# Patient Record
Sex: FEMALE | Race: WHITE | NOT HISPANIC OR LATINO | Employment: UNEMPLOYED | URBAN - METROPOLITAN AREA
[De-identification: names, ages, dates, MRNs, and addresses within clinical notes are randomized per-mention and may not be internally consistent; named-entity substitution may affect disease eponyms.]

---

## 2023-03-14 NOTE — PROGRESS NOTES
Chief Complaint  Establish Care and Annual Exam (Yearly Check Up )    Subjective          Roya presents to [facility] for a new patient visit.  Accompanied by mother.  Her father  last year, and she and her mother moved to [place] recently.    She has a seizure disorder, cerebral palsy, intellectual disability, chronic vision loss.    She was born via emergency  status post meconium aspiration.  She apparently was nearly  at the time of birth, but was resuscitated.  She had seizures shortly after birth which were controlled until she was about 6 years old.  At that time she started seeing a neurologist, has been on stable dose of Tegretol 100 mg, 1.5 tablets in the morning, 1.5 tablets at noon, 2.5 tablets at bedtime.  Seizures previously were grand mal, has not had one of those since age 11.  However, there is concern that she is having small seizures due to staring spells.  She needs a referral to a neurologist here in [place].    At baseline, she is able to say a few words.  She does not walk, but she crawls to get around.  No major issues with spasticity. She was in a wheelchair today in the office.  She is able to feed herself, but her mother does note gurgling at the time of meals.  She has chronic vision loss and will need an ophthalmologist.    I noticed she is tachycardic, mother states that is been a chronic issue.  States that there was some sort of issue with her heart when she was younger, but has not seen a cardiologist in many years.    She has been on Depo-Provera for menstrual suppression since the age of 14 or 15.  She is due for another injection very soon.  She has not had any issues with this medication.    As far as routine maintenance issues, she has low tolerance for being touched or any sort of medical procedure.  Getting labs drawn is very difficult for her.  Her mother does not think she could tolerate a mammogram much less any  "sort of treatment for breast cancer should it be found.    No issues with wounds or decubitus ulcers, but she does occasionally get scratches on her trunk.    In the last year or so, she has had significant issues with rosacea and eczema on her face.  She uses metronidazole cream for that.  Needs a dermatologist here in [place].     Objective   Vital Signs:   Vitals:    03/21/23 1245   BP: 132/84   Pulse: 114   Temp: 97.8 °F (36.6 °C)   SpO2: 98%   Height: 158.8 cm (62.5\")                Physical Exam  Constitutional:       General: She is not in acute distress.     Appearance: Normal appearance. She is obese. She is not toxic-appearing.   HENT:      Head: Normocephalic and atraumatic.      Mouth/Throat:      Mouth: Mucous membranes are moist.   Eyes:      General: No scleral icterus.     Conjunctiva/sclera: Conjunctivae normal.   Cardiovascular:      Rate and Rhythm: Regular rhythm. Tachycardia present.      Heart sounds: Normal heart sounds. No murmur heard.    No friction rub. No gallop.   Pulmonary:      Effort: Pulmonary effort is normal. No respiratory distress.      Comments: Occasional coarse breath sounds bilateral lungs  Musculoskeletal:         General: No swelling, tenderness or deformity. Normal range of motion.      Cervical back: Normal range of motion and neck supple.   Skin:     General: Skin is warm and dry.      Findings: No lesion or rash.   Neurological:      General: No focal deficit present.      Mental Status: She is alert. Mental status is at baseline.      Motor: Weakness present.      Comments: In wheelchair, muscle atrophy in lower extremities   Psychiatric:      Comments: Calm and cooperative          Result Review :                Assessment and Plan    Diagnoses and all orders for this visit:    1. Nonintractable epilepsy without status epilepticus, unspecified epilepsy type (HCC) (Primary)  Assessment & Plan:  Previously grand mal seizures, has not had one of those since the age of " 11.  She has been on a stable dose of Tegretol for many years.  Her mother is requesting refills which I told I could provide in the short-term but that I was not comfortable prescribing in the long-term. Refer to neurology.  There is concern that she is having partial seizures, even more reason to get her into neurology.    Orders:  -     Ambulatory Referral to Neurology    2. Routine health maintenance  Assessment & Plan:  We will hold off on checking labs as that is very difficult for the patient.  Will obtain medical records from prior medical providers, as she apparently recently had labs in January 2023.  If we need labs, will reach out to patient's mother.    As far as procedures like mammograms, Pap smears, colon cancer screening, her mother does not believe she would be able to tolerate these tests much less interventions for these problems.  Thus, we will hold off on ordering.      3. Rosacea  Assessment & Plan:  Continue metronidazole cream, refilled, and refer to dermatology.    Orders:  -     Ambulatory Referral to Dermatology    4. Eczema, unspecified type  -     Ambulatory Referral to Dermatology    5. Other cerebral palsy (HCC)  Assessment & Plan:  I signed a form which is to be scanned into the chart indicating that the patient has permanent disability from her chronic medical conditions.    Orders:  -     Ambulatory Referral to Neurology  -     Ambulatory Referral to Speech Therapy    6. Gurgling breath sounds  Assessment & Plan:  I noticed some intermittent rhonchi on exam.  Patient's mother states that she notices gurgling when patient eats.  I believe a speech therapy evaluation is in order as I do worry about the possibility of aspiration.  Speech therapy evaluation ordered.    Orders:  -     Ambulatory Referral to Speech Therapy    7. Need for vaccination    8. Visual impairment  Assessment & Plan:  Refer to ophthalmology    Orders:  -     Ambulatory Referral to Ophthalmology    9. Menstrual  suppression  Assessment & Plan:  Continue Depo-Provera, has not had any issues.  I sent the prescription to the pharmacy, will need a nurse visit every 13 weeks for injection.        10. Intellectual disability    Other orders  -     Tdap Vaccine Greater Than or Equal To 6yo IM  -     metroNIDAZOLE (METROCREAM) 0.75 % cream; Apply 1 application topically to the appropriate area as directed 2 (Two) Times a Day.  Dispense: 45 g; Refill: 6  -     medroxyPROGESTERone Acetate (Depo-Provera) 150 MG/ML suspension prefilled syringe; Inject 1 mL into the appropriate muscle as directed by prescriber Every 3 (Three) Months. Specifically, give every 13 weeks  Dispense: 1 mL; Refill: 3  -     carBAMazepine (TEGretol) 100 MG chewable tablet; Chew 1 and 1/2 tabs in the morning , 1 and 1/2 tabs in the afternoon , then 2 and 1/2 tabs at bed time  Dispense: 165 tablet; Refill: 2      Follow Up   Return in about 6 months (around 9/21/2023), or if symptoms worsen or fail to improve, for Adult Wellness Visit-30 Minutes.  Patient was given instructions and counseling regarding her condition or for health maintenance advice. Please see specific information pulled into the AVS if appropriate.

## 2023-03-21 ENCOUNTER — OFFICE VISIT (OUTPATIENT)
Dept: FAMILY MEDICINE CLINIC | Facility: CLINIC | Age: 43
End: 2023-03-21
Payer: COMMERCIAL

## 2023-03-21 VITALS
HEART RATE: 114 BPM | HEIGHT: 63 IN | TEMPERATURE: 97.8 F | OXYGEN SATURATION: 98 % | DIASTOLIC BLOOD PRESSURE: 84 MMHG | SYSTOLIC BLOOD PRESSURE: 132 MMHG

## 2023-03-21 DIAGNOSIS — H54.7 VISUAL IMPAIRMENT: ICD-10-CM

## 2023-03-21 DIAGNOSIS — R09.89 GURGLING BREATH SOUNDS: ICD-10-CM

## 2023-03-21 DIAGNOSIS — Z23 NEED FOR VACCINATION: ICD-10-CM

## 2023-03-21 DIAGNOSIS — Z00.00 ROUTINE HEALTH MAINTENANCE: ICD-10-CM

## 2023-03-21 DIAGNOSIS — L71.9 ROSACEA: ICD-10-CM

## 2023-03-21 DIAGNOSIS — L30.9 ECZEMA, UNSPECIFIED TYPE: ICD-10-CM

## 2023-03-21 DIAGNOSIS — N94.89 MENSTRUAL SUPPRESSION: ICD-10-CM

## 2023-03-21 DIAGNOSIS — F79 INTELLECTUAL DISABILITY: ICD-10-CM

## 2023-03-21 DIAGNOSIS — G80.8 OTHER CEREBRAL PALSY: ICD-10-CM

## 2023-03-21 DIAGNOSIS — G40.909 NONINTRACTABLE EPILEPSY WITHOUT STATUS EPILEPTICUS, UNSPECIFIED EPILEPSY TYPE: Primary | ICD-10-CM

## 2023-03-21 RX ORDER — MEDROXYPROGESTERONE ACETATE 150 MG/ML
INJECTION, SUSPENSION INTRAMUSCULAR
COMMUNITY
Start: 2023-03-14 | End: 2023-03-21 | Stop reason: SDUPTHER

## 2023-03-21 RX ORDER — CARBAMAZEPINE 100 MG/1
100 TABLET, CHEWABLE ORAL
COMMUNITY
Start: 2023-03-14 | End: 2023-03-21 | Stop reason: SDUPTHER

## 2023-03-21 RX ORDER — MEDROXYPROGESTERONE ACETATE 150 MG/ML
150 INJECTION, SUSPENSION INTRAMUSCULAR
Qty: 1 ML | Refills: 3 | Status: SHIPPED | OUTPATIENT
Start: 2023-03-21

## 2023-03-21 RX ORDER — CARBAMAZEPINE 100 MG/1
TABLET, CHEWABLE ORAL
Qty: 165 TABLET | Refills: 2 | Status: SHIPPED | OUTPATIENT
Start: 2023-03-21

## 2023-03-21 NOTE — ASSESSMENT & PLAN NOTE
I signed a form which is to be scanned into the chart indicating that the patient has permanent disability from her chronic medical conditions.

## 2023-03-21 NOTE — ASSESSMENT & PLAN NOTE
Continue Depo-Provera, has not had any issues.  I sent the prescription to the pharmacy, will need a nurse visit every 13 weeks for injection.

## 2023-03-21 NOTE — ASSESSMENT & PLAN NOTE
We will hold off on checking labs as that is very difficult for the patient.  Will obtain medical records from prior medical providers, as she apparently recently had labs in January 2023.  If we need labs, will reach out to patient's mother.    As far as procedures like mammograms, Pap smears, colon cancer screening, her mother does not believe she would be able to tolerate these tests much less interventions for these problems.  Thus, we will hold off on ordering.

## 2023-03-21 NOTE — ASSESSMENT & PLAN NOTE
Previously grand mal seizures, has not had one of those since the age of 11.  She has been on a stable dose of Tegretol for many years.  Her mother is requesting refills which I told I could provide in the short-term but that I was not comfortable prescribing in the long-term. Refer to neurology.  There is concern that she is having partial seizures, even more reason to get her into neurology.

## 2023-03-31 ENCOUNTER — CLINICAL SUPPORT (OUTPATIENT)
Dept: FAMILY MEDICINE CLINIC | Facility: CLINIC | Age: 43
End: 2023-03-31
Payer: COMMERCIAL

## 2023-03-31 DIAGNOSIS — N94.89 MENSTRUAL SUPPRESSION: Primary | ICD-10-CM

## 2023-03-31 RX ORDER — MEDROXYPROGESTERONE ACETATE 150 MG/ML
150 INJECTION, SUSPENSION INTRAMUSCULAR ONCE
Status: COMPLETED | OUTPATIENT
Start: 2023-03-31 | End: 2023-03-31

## 2023-03-31 RX ADMIN — MEDROXYPROGESTERONE ACETATE 150 MG: 150 INJECTION, SUSPENSION INTRAMUSCULAR at 16:02

## 2023-03-31 NOTE — PROGRESS NOTES
Injection  Injection performed in RA by Patrizia Portillo LPN.   03/31/23   Patrizia Portillo LPN

## 2023-06-05 ENCOUNTER — CLINICAL SUPPORT (OUTPATIENT)
Dept: FAMILY MEDICINE CLINIC | Facility: CLINIC | Age: 43
End: 2023-06-05
Payer: MEDICARE

## 2023-06-05 DIAGNOSIS — N94.89 MENSTRUAL SUPPRESSION: Primary | ICD-10-CM

## 2023-06-05 PROCEDURE — 96372 THER/PROPH/DIAG INJ SC/IM: CPT | Performed by: FAMILY MEDICINE

## 2023-06-05 RX ORDER — MEDROXYPROGESTERONE ACETATE 150 MG/ML
150 INJECTION, SUSPENSION INTRAMUSCULAR ONCE
Status: COMPLETED | OUTPATIENT
Start: 2023-06-05 | End: 2023-06-05

## 2023-06-05 RX ADMIN — MEDROXYPROGESTERONE ACETATE 150 MG: 150 INJECTION, SUSPENSION INTRAMUSCULAR at 14:39

## 2023-06-05 NOTE — PROGRESS NOTES
Injection  Injection performed in RD by Patrizia Portillo LPN. Patient tolerated the procedure well without complications.  06/05/23   Patrizia Portillo LPN

## 2023-07-21 ENCOUNTER — TELEPHONE (OUTPATIENT)
Dept: FAMILY MEDICINE CLINIC | Facility: CLINIC | Age: 43
End: 2023-07-21

## 2023-07-21 NOTE — TELEPHONE ENCOUNTER
Caller: FORREST GREWAL    Relationship: Home Health    Best call back number: [phone]     What was the call regarding: FORREST GREWAL, PHYSICAL THERAPIST, STATED THAT PATIENT HAS CP. FORREST STATED THAT MOM DID NOT FEEL THERE WAS A PROBLEM WITH MOBILITY OR STRENGTH. FORREST STATED IT IS MORE PROBLEM SOLVING FOR BATHROOM TRANSFERS AND GETTING CONTACTS FOR COMMUNITY RESOURCES, WHICH THEY WERE ABLE TO COMPLETE IN ONE VISIT, SO SHE WILL NOT BE PICKING HER UP FOR MORE VISITS. THEY ACCOMPLISHED ALL THAT THEY NEEDED TO TODAY. IF ANY QUESTIONS, PLEASE CALL HER.

## 2023-08-28 NOTE — PROGRESS NOTES
"Chief Complaint  Seizures (Follow up and wanting to get injection today )    Subjective        HPI   Roya presents to [facility] for a follow up visit. She is accompanied by her mother/caretaker Kenisha.    Neurology appt: Saw Dr. Toure, Tegretol changed to Keppra.     Menstrual supression: Due for DepoProvera injection. Did have some bleeding when late for injection.     Saw ophyovanyo. Kenisha states pt would need [place] for full exam. Ophtho has privileges at [place] and [place]. She also needs a dentist but pt unable to tolerate any sort of exam/cleaning without anesthesia.    Trying to get medicaid waiver, needs additional documentation from PCP. Roya can feed herself, use restroom on own (crawls), can get in and out of chair/bed, but needs help dressing/bathing/brushing teeth. She is minimally verbal, uses some sign language to communicate. Unable to tolerate most medical exams, tests. She has abrasions/thickening of the skin on her knees from crawling, unable to stand on them due to contractures etc.     Gets COVID and flu vaccines when due    Got labs at Dr. Toure's office, need records        Objective   Vital Signs:   Vitals:    09/01/23 0959   BP: 120/86   BP Location: Right arm   Patient Position: Sitting   Cuff Size: Large Adult   Pulse: 89   SpO2: 98%   Height: 158.8 cm (62.5\")                Physical Exam  Constitutional:       General: She is not in acute distress.     Appearance: Normal appearance. She is obese.   HENT:      Head: Normocephalic and atraumatic.   Eyes:      Conjunctiva/sclera: Conjunctivae normal.      Comments: Wears glasses   Cardiovascular:      Rate and Rhythm: Normal rate.      Comments: Declines exam  Pulmonary:      Effort: Pulmonary effort is normal. No respiratory distress.      Comments: Pt declines  Musculoskeletal:         General: Deformity present. No swelling.      Comments: Flexion contractures BLE   Skin:     Coloration: Skin is not " jaundiced.      Findings: No rash.      Comments: Skin thickening on knees   Neurological:      General: No focal deficit present.      Mental Status: She is alert. Mental status is at baseline.      Comments: In wheelchair, flexion contractures, minimally verbal, flexion contractures b/l knees, chronic vision impairment        Result Review :                Assessment and Plan    Diagnoses and all orders for this visit:    1. Menstrual suppression (Primary)  Assessment & Plan:  Had breakthrough bleeding when late for dose. Con't DeproProvera injections q3 mos, given today.    Orders:  -     MedroxyPROGESTERone Acetate (DEPO-PROVERA) injection 150 mg    2. Routine health maintenance  Assessment & Plan:  As far as procedures like mammograms, Pap smears, colon cancer screening, her mother does not believe she would be able to tolerate these tests much less interventions for these problems.  Thus, we will not order. However, she is overdue for dental care--likely would need anesthesia to tolerate exam/cleaning/procedures. Will investigate options for her.       3. Nonintractable epilepsy without status epilepticus, unspecified epilepsy type  Assessment & Plan:  On Keppra per Dr. Toure      4. Intellectual disability  Assessment & Plan:  Appears to be severe, only able to feed self, crawl to bathroom, otherwise requires total care from her mother      5. Other cerebral palsy    6. Visual impairment  Assessment & Plan:  Follows with ophtho, unable to get full exam without anesthesia per pt's mother      Obtain lab records from Dr. Toure's office  Can get flu vaccine at local pharmacy (do not have here in office at this time)      Follow Up   Return in about 1 month (around 10/1/2023) for Annual physical, Recheck.  Patient was given instructions and counseling regarding her condition or for health maintenance advice. Please see specific information pulled into the AVS if appropriate. Answers submitted by the patient for  this visit:  Primary Reason for Visit (Submitted on 8/30/2023)  What is the primary reason for your visit?: Physical

## 2023-09-01 ENCOUNTER — OFFICE VISIT (OUTPATIENT)
Dept: FAMILY MEDICINE CLINIC | Facility: CLINIC | Age: 43
End: 2023-09-01
Payer: COMMERCIAL

## 2023-09-01 ENCOUNTER — TELEPHONE (OUTPATIENT)
Dept: FAMILY MEDICINE CLINIC | Facility: CLINIC | Age: 43
End: 2023-09-01

## 2023-09-01 VITALS
DIASTOLIC BLOOD PRESSURE: 86 MMHG | HEIGHT: 63 IN | HEART RATE: 89 BPM | SYSTOLIC BLOOD PRESSURE: 120 MMHG | OXYGEN SATURATION: 98 %

## 2023-09-01 DIAGNOSIS — F79 INTELLECTUAL DISABILITY: ICD-10-CM

## 2023-09-01 DIAGNOSIS — N94.89 MENSTRUAL SUPPRESSION: Primary | ICD-10-CM

## 2023-09-01 DIAGNOSIS — G40.909 NONINTRACTABLE EPILEPSY WITHOUT STATUS EPILEPTICUS, UNSPECIFIED EPILEPSY TYPE: ICD-10-CM

## 2023-09-01 DIAGNOSIS — Z00.00 ROUTINE HEALTH MAINTENANCE: ICD-10-CM

## 2023-09-01 DIAGNOSIS — H54.7 VISUAL IMPAIRMENT: ICD-10-CM

## 2023-09-01 DIAGNOSIS — G80.8 OTHER CEREBRAL PALSY: ICD-10-CM

## 2023-09-01 RX ORDER — MEDROXYPROGESTERONE ACETATE 150 MG/ML
150 INJECTION, SUSPENSION INTRAMUSCULAR ONCE
Status: COMPLETED | OUTPATIENT
Start: 2023-09-01 | End: 2023-09-01

## 2023-09-01 RX ORDER — LEVETIRACETAM 100 MG/ML
500 SOLUTION ORAL
COMMUNITY
Start: 2023-07-12 | End: 2024-07-06

## 2023-09-01 RX ADMIN — MEDROXYPROGESTERONE ACETATE 150 MG: 150 INJECTION, SUSPENSION INTRAMUSCULAR at 10:47

## 2023-09-01 NOTE — ASSESSMENT & PLAN NOTE
As far as procedures like mammograms, Pap smears, colon cancer screening, her mother does not believe she would be able to tolerate these tests much less interventions for these problems.  Thus, we will not order. However, she is overdue for dental care--likely would need anesthesia to tolerate exam/cleaning/procedures. Will investigate options for her.

## 2023-09-01 NOTE — TELEPHONE ENCOUNTER
See staff note. Need to send most recent OV note to [facility], [phone].

## 2023-09-01 NOTE — ASSESSMENT & PLAN NOTE
Appears to be severe, only able to feed self, crawl to bathroom, otherwise requires total care from her mother

## 2023-09-26 ENCOUNTER — TELEPHONE (OUTPATIENT)
Dept: FAMILY MEDICINE CLINIC | Facility: CLINIC | Age: 43
End: 2023-09-26

## 2023-09-26 NOTE — TELEPHONE ENCOUNTER
Caller: [facility] [employer]    Relationship: Other    Best call back number: [phone]     What was the call regarding: PATIENT'S POA STATES THAT THE PATIENT HAS BEEN COUGHING. THE NURSE THAT VISITED TODAY FOR HER ANNUAL PHYSICAL LISTENED TO HER LUNGS AND SHE DOES HAVE WHEEZING. PLEASE ADVISE ON NEXT STEPS.

## 2023-09-27 ENCOUNTER — HOSPITAL ENCOUNTER (OUTPATIENT)
Dept: GENERAL RADIOLOGY | Facility: HOSPITAL | Age: 43
Discharge: HOME | End: 2023-09-27
Admitting: INTERNAL MEDICINE
Payer: MEDICARE

## 2023-09-27 ENCOUNTER — OFFICE VISIT (OUTPATIENT)
Dept: FAMILY MEDICINE CLINIC | Facility: CLINIC | Age: 43
End: 2023-09-27
Payer: MEDICARE

## 2023-09-27 VITALS
HEIGHT: 63 IN | DIASTOLIC BLOOD PRESSURE: 90 MMHG | OXYGEN SATURATION: 99 % | HEART RATE: 105 BPM | SYSTOLIC BLOOD PRESSURE: 128 MMHG

## 2023-09-27 DIAGNOSIS — R05.2 SUBACUTE COUGH: ICD-10-CM

## 2023-09-27 DIAGNOSIS — R06.2 WHEEZING: Primary | ICD-10-CM

## 2023-09-27 PROCEDURE — 99214 OFFICE O/P EST MOD 30 MIN: CPT | Performed by: INTERNAL MEDICINE

## 2023-09-27 PROCEDURE — 71046 X-RAY EXAM CHEST 2 VIEWS: CPT

## 2023-09-27 PROCEDURE — 1160F RVW MEDS BY RX/DR IN RCRD: CPT | Performed by: INTERNAL MEDICINE

## 2023-09-27 PROCEDURE — 1159F MED LIST DOCD IN RCRD: CPT | Performed by: INTERNAL MEDICINE

## 2023-09-27 RX ORDER — MONTELUKAST SODIUM 5 MG/1
10 TABLET, CHEWABLE ORAL NIGHTLY
Qty: 60 TABLET | Refills: 6 | Status: SHIPPED | OUTPATIENT
Start: 2023-09-27

## 2023-09-27 RX ORDER — CETIRIZINE HYDROCHLORIDE 5 MG/1
5 TABLET ORAL DAILY
Qty: 300 ML | Refills: 6 | Status: SHIPPED | OUTPATIENT
Start: 2023-09-27

## 2023-09-27 NOTE — PROGRESS NOTES
"Chief Complaint  Wheezing    Subjective        HPI   Roya presents to [facility] for a cough. She is accompanied by her mother Kenisha.     Cough has been present x 6-8 weeks. Yesterday, she got a wellness visit from her insurance co and the person who examined her noted wheezing. No hx asthma, never has needed inhalers. No coughing/choking with eating. Cough is non-productive, no fevers, acting normal otherwise. Has taking using robitussin, dimetap. Patient moved here from [place] after being there 17 years, has had a cough off and on since moving here.      Objective   Vital Signs:   Vitals:    09/27/23 1504   BP: 128/90   BP Location: Right arm   Patient Position: Sitting   Cuff Size: Large Adult   Pulse: 105   SpO2: 99%   Height: 158.8 cm (62.5\")                Physical Exam  Constitutional:       General: She is not in acute distress.     Appearance: Normal appearance. She is obese. She is not toxic-appearing.   HENT:      Head: Normocephalic and atraumatic.      Mouth/Throat:      Mouth: Mucous membranes are moist.   Eyes:      General: No scleral icterus.     Conjunctiva/sclera: Conjunctivae normal.   Cardiovascular:      Rate and Rhythm: Normal rate and regular rhythm.      Heart sounds: Normal heart sounds. No murmur heard.    No friction rub. No gallop.   Pulmonary:      Effort: Pulmonary effort is normal. No respiratory distress.      Breath sounds: Wheezing present.      Comments: Exam limited by pt's ability to participate.  Musculoskeletal:         General: No swelling, tenderness or deformity. Normal range of motion.      Cervical back: Normal range of motion and neck supple.      Comments: In wheelchair, has flexion contractures BLE   Skin:     General: Skin is warm and dry.      Findings: No lesion or rash.   Neurological:      General: No focal deficit present.      Mental Status: She is alert.      Comments: Communicates using limited sign language   Psychiatric:      " Comments: Calm and cooperative        Result Review :                Assessment and Plan    Diagnoses and all orders for this visit:    1. Wheezing (Primary)  -     Cetirizine HCl (ZyrTE Childrens Allergy) 5 MG/5ML solution solution; Take 5 mL by mouth Daily.  Dispense: 300 mL; Refill: 6  -     montelukast (Singulair) 5 MG chewable tablet; Chew 2 tablets Every Night.  Dispense: 60 tablet; Refill: 6  -     XR Chest PA & Lateral    2. Subacute cough    Exam is limited by patient's ability to participate, but on the limited exam I could get, I did notice wheezing.  Patient has had a cough off and on since moving to [place], previously was living in [place] where allergies were not an issue.  She already has eczema which is another atopic condition so I would not be surprised if she has asthma.  She never has had a diagnosis of asthma, but I do wonder if this could be going on.  There is no way that I can formally diagnosis, as Roya would not be able to a tolerate pulmonary function test.  I would like her to try to get a chest x-ray, as I would like to know if she has infiltrates that would indicate concern for pneumonia and/or aspiration.  We are going to try to get that today.  I discussed treating with albuterol via a nebulizer, Kenisha concerned that she would not be able to tolerate it. Reassuringly, there is normal oxygen saturation, patient appears well, no fevers.  Patient is unable to swallow pills, sent in Zyrtec liquid and chewable Singulair to start.  Will follow-up chest x-ray.  The other thing we previously discussed was getting a speech therapy evaluation to see if she is aspirating.  We will keep this on the table as an option.    Lastly, Roya still needs to get a dental exam, but it will need to be under anesthesia.  I contacted some colleagues to see what options there are and it was recommended that we check with [facility] or [facility]. Kenisha will look into  this.      Follow Up   Return in about 4 weeks (around 10/25/2023) for Recheck, Next scheduled follow up.  Patient was given instructions and counseling regarding her condition or for health maintenance advice. Please see specific information pulled into the AVS if appropriate. Answers submitted by the patient for this visit:  Primary Reason for Visit (Submitted on 9/27/2023)  What is the primary reason for your visit?: Cough

## 2023-09-27 NOTE — Clinical Note
Please obtain most recent set of labs from  neurology (CMP, CBC), not visible in [facility]. Please also send my most recent progress note (the one prior to this visit) to [facility] again. There's a prior task with the contact information. Pt's mother said it looks like we sent it, but [facility] never got it. Thanks!

## 2023-09-28 DIAGNOSIS — K44.9 HIATAL HERNIA: Primary | ICD-10-CM

## 2023-09-28 RX ORDER — LANSOPRAZOLE
30 KIT EVERY MORNING
Qty: 300 ML | Refills: 5 | Status: SHIPPED | OUTPATIENT
Start: 2023-09-28

## 2023-11-06 NOTE — PROGRESS NOTES
"Chief Complaint  Follow-up    Subjective        HPI   Roya presents to [facility] for a follow up visit. After her last visit, I discovered that she has a large hiatal hernia. She was started on PPI and referred to surgery. Unable to find Lansoprazole liquid at any pharmacy, taking dissolvable tabs instead. Mom Kenisha said issues getting surg appt since guardian papers were not on file. She has them today.    Due for DepoProvera soon    Was having mood swings on Keppra, crying, laughing, angry, no wanting to eat. Now doing better on Lamictal.    Medicaid waiver process going well, qualifies for a day program two days per week        Objective   Vital Signs:   Vitals:    11/15/23 1000   BP: 134/86   BP Location: Left arm   Patient Position: Sitting   Cuff Size: Large Adult   Pulse: 118   SpO2: 98%   Height: 158.8 cm (62.5\")            3/21/2023    12:50 PM   PHQ-2/PHQ-9 Depression Screening   Little Interest or Pleasure in Doing Things 0-->not at all   Feeling Down, Depressed or Hopeless 0-->not at all   PHQ-9: Brief Depression Severity Measure Score 0               Physical Exam  Constitutional:       General: She is not in acute distress.     Appearance: Normal appearance. She is obese. She is not toxic-appearing.   HENT:      Head: Normocephalic and atraumatic.      Mouth/Throat:      Mouth: Mucous membranes are moist.   Eyes:      General: No scleral icterus.     Conjunctiva/sclera: Conjunctivae normal.   Cardiovascular:      Rate and Rhythm: Normal rate and regular rhythm.      Heart sounds: Normal heart sounds. No murmur heard.     No friction rub. No gallop.   Pulmonary:      Effort: Pulmonary effort is normal. No respiratory distress.      Comments: Exam limited by pt's ability to participate.  Occ wheeze but improved from prior    Musculoskeletal:         General: No swelling, tenderness or deformity. Normal range of motion.      Cervical back: Normal range of motion and " neck supple.      Comments: In wheelchair, has flexion contractures BLE   Skin:     General: Skin is warm and dry.      Findings: No lesion or rash.   Neurological:      General: No focal deficit present.      Mental Status: She is alert.      Comments: Communicates using limited sign language, limited verbal interactions   Psychiatric:      Comments: Calm and cooperative          Result Review :                Assessment and Plan    Diagnoses and all orders for this visit:    1. Hiatal hernia (Primary)  Assessment & Plan:  Large hiatal hernia on CXR, likely silently aspirating. May be reason for wheezing. Referred to surgery last visit but unable to schedule appt due to guardianship papers not on file. They will be scanned in today and Kenisha to call to set up appt. Work up and tx for this issue would be complex, likely would need anesthesia for even CT scan. Kenisha asked what I thought about not fixing it/not putting pt through this and letting natural hx of this take course. I think given Roya's mental/physical/intellectual functional status, this is a reasonable option but I also recommended at least getting Dr. Wren's opinion on options. Kenisha agreed this would be a good idea.       2. Menstrual suppression  Assessment & Plan:  Hoping pt could get this done today but I told Kenisha she's not due for about another 15 days. She can get these injections at [facility] only visits. Refilled rx.     Orders:  -     medroxyPROGESTERone Acetate (Depo-Provera) 150 MG/ML suspension prefilled syringe; Inject 1 mL into the appropriate muscle as directed by prescriber Every 3 (Three) Months. Specifically, give every 13 weeks  Dispense: 1 mL; Refill: 3    3. Other cerebral palsy    4. Nonintractable epilepsy without status epilepticus, unspecified epilepsy type  Assessment & Plan:  Follows with Dr. Toure, therapy recently changed due to mood swings on Keppra. Now on Lamictal and doing better.      5. Intellectual  disability    Other orders  -     Cancel: Fluzone (or Fluarix & Flulaval for VFC) >6mos    We were out of flu vaccines today    Follow Up   Return in about 6 months (around 5/15/2024) for Annual physical, Next scheduled follow up.  Patient was given instructions and counseling regarding her condition or for health maintenance advice. Please see specific information pulled into the AVS if appropriate.

## 2023-11-15 ENCOUNTER — OFFICE VISIT (OUTPATIENT)
Dept: FAMILY MEDICINE CLINIC | Facility: CLINIC | Age: 43
End: 2023-11-15
Payer: MEDICARE

## 2023-11-15 VITALS
HEART RATE: 118 BPM | SYSTOLIC BLOOD PRESSURE: 134 MMHG | HEIGHT: 63 IN | DIASTOLIC BLOOD PRESSURE: 86 MMHG | OXYGEN SATURATION: 98 %

## 2023-11-15 DIAGNOSIS — G80.8 OTHER CEREBRAL PALSY: ICD-10-CM

## 2023-11-15 DIAGNOSIS — K44.9 HIATAL HERNIA: Primary | ICD-10-CM

## 2023-11-15 DIAGNOSIS — G40.909 NONINTRACTABLE EPILEPSY WITHOUT STATUS EPILEPTICUS, UNSPECIFIED EPILEPSY TYPE: ICD-10-CM

## 2023-11-15 DIAGNOSIS — F79 INTELLECTUAL DISABILITY: ICD-10-CM

## 2023-11-15 DIAGNOSIS — N94.89 MENSTRUAL SUPPRESSION: ICD-10-CM

## 2023-11-15 PROCEDURE — 99214 OFFICE O/P EST MOD 30 MIN: CPT | Performed by: INTERNAL MEDICINE

## 2023-11-15 PROCEDURE — 1160F RVW MEDS BY RX/DR IN RCRD: CPT | Performed by: INTERNAL MEDICINE

## 2023-11-15 PROCEDURE — 1159F MED LIST DOCD IN RCRD: CPT | Performed by: INTERNAL MEDICINE

## 2023-11-15 RX ORDER — LAMOTRIGINE 25 MG/1
TABLET, CHEWABLE ORAL
COMMUNITY
Start: 2023-11-06 | End: 2024-01-03

## 2023-11-15 RX ORDER — MEDROXYPROGESTERONE ACETATE 150 MG/ML
150 INJECTION, SUSPENSION INTRAMUSCULAR
Qty: 1 ML | Refills: 3 | Status: SHIPPED | OUTPATIENT
Start: 2023-11-15

## 2023-11-15 NOTE — ASSESSMENT & PLAN NOTE
Follows with Dr. Toure, therapy recently changed due to mood swings on Keppra. Now on Lamictal and doing better.

## 2023-11-15 NOTE — ASSESSMENT & PLAN NOTE
Hoping pt could get this done today but I told Kenisha she's not due for about another 15 days. She can get these injections at [facility] only visits. Refilled rx.

## 2023-11-15 NOTE — ASSESSMENT & PLAN NOTE
Large hiatal hernia on CXR, likely silently aspirating. May be reason for wheezing. Referred to surgery last visit but unable to schedule appt due to guardianship papers not on file. They will be scanned in today and Kenisha to call to set up appt. Work up and tx for this issue would be complex, likely would need anesthesia for even CT scan. Kenisha asked what I thought about not fixing it/not putting pt through this and letting natural hx of this take course. I think given Roya's mental/physical/intellectual functional status, this is a reasonable option but I also recommended at least getting Dr. Wren's opinion on options. Kenisha agreed this would be a good idea.

## 2023-11-16 ENCOUNTER — TELEPHONE (OUTPATIENT)
Dept: FAMILY MEDICINE CLINIC | Facility: CLINIC | Age: 43
End: 2023-11-16

## 2023-11-16 NOTE — TELEPHONE ENCOUNTER
Hub staff attempted to follow warm transfer process and was unsuccessful     Caller: NICOLE ODELL    Relationship to patient: Mother    Best call back number: [phone]    Patient is needing: THE PATIENT WOULD LIKE TO SWITCH HER NURSE VISIT FOR AN INJECTION FROM 12/01/2023 TO 11/22/2023. PLEASE ADVISE.

## 2023-11-22 ENCOUNTER — CLINICAL SUPPORT (OUTPATIENT)
Dept: FAMILY MEDICINE CLINIC | Facility: CLINIC | Age: 43
End: 2023-11-22
Payer: MEDICARE

## 2023-11-22 DIAGNOSIS — N94.89 MENSTRUAL SUPPRESSION: Primary | ICD-10-CM

## 2023-11-22 RX ORDER — MEDROXYPROGESTERONE ACETATE 150 MG/ML
150 INJECTION, SUSPENSION INTRAMUSCULAR ONCE
Status: COMPLETED | OUTPATIENT
Start: 2023-11-22 | End: 2023-11-22

## 2023-11-22 RX ADMIN — MEDROXYPROGESTERONE ACETATE 150 MG: 150 INJECTION, SUSPENSION INTRAMUSCULAR at 11:08

## 2023-12-05 ENCOUNTER — OFFICE VISIT (OUTPATIENT)
Dept: SURGERY | Facility: CLINIC | Age: 43
End: 2023-12-05
Payer: MEDICARE

## 2023-12-05 VITALS — HEIGHT: 60 IN | SYSTOLIC BLOOD PRESSURE: 124 MMHG | DIASTOLIC BLOOD PRESSURE: 82 MMHG

## 2023-12-05 DIAGNOSIS — G80.8 OTHER CEREBRAL PALSY: ICD-10-CM

## 2023-12-05 DIAGNOSIS — K44.9 HIATAL HERNIA: Primary | ICD-10-CM

## 2023-12-05 PROCEDURE — 1159F MED LIST DOCD IN RCRD: CPT | Performed by: SURGERY

## 2023-12-05 PROCEDURE — 99203 OFFICE O/P NEW LOW 30 MIN: CPT | Performed by: SURGERY

## 2023-12-05 PROCEDURE — 1160F RVW MEDS BY RX/DR IN RCRD: CPT | Performed by: SURGERY

## 2023-12-05 RX ORDER — LANSOPRAZOLE
30 KIT EVERY MORNING
Qty: 300 ML | Refills: 5 | Status: SHIPPED | OUTPATIENT
Start: 2023-12-05

## 2023-12-20 NOTE — PROGRESS NOTES
General Surgery  Initial Office Visit    CC: Hiatal hernia    HPI: The patient is a pleasant 43 y.o. year-old lady who presents today with her mother for discussion of an incidentally discovered large hiatal hernia found on a chest x-ray performed in September when she was experiencing wheezing and there was concern she may have aspirated.  The patient has cerebral palsy and is completely nonverbal.  Her mother reports she is able to eat soft food and has never required a feeding tube for nutrition.  She denies any chronic nausea or vomiting and has had no regurgitation of undigested food.  All other history is unable to be obtained since the patient is nonverbal.    Past Medical History:   Cerebral palsy  Epilepsy    Past Surgical History:   Eye surgery  Foot surgery    Medications:   Cetirizine 5 mL daily  Lamictal 25 mg chewable tablet daily  Prevacid 10 mL daily  Keppra 5 mL daily  Depo-Provera intramuscular shot every 3 months  Metronidazole cream twice daily    Allergies: No known drug allergies    Family History: Mother with history of hyperlipidemia and type 2 diabetes, maternal grandfather with history of stroke    Social History: Non-smoker, no regular alcohol use, single and lives with her mother who is her primary caretaker    ROS: Unable to be obtained, patient is nonverbal    Physical Exam:  Height: 152 cm  Weight: Unable to be measured, she is in a motorized wheelchair  BMI: Unable to be calculated  General: No acute distress, well-nourished & well-developed  HEAD: normocephalic, atraumatic  EYES: normal conjunctiva, sclera anicteric  EARS: grossly normal hearing  NECK: supple, no thyromegaly  CARDIOVASCULAR: regular rate and rhythm  RESPIRATORY: clear to auscultation bilaterally  GASTROINTESTINAL: soft, nontender, non-distended  MUSCULOSKELETAL: Seated in a motorized wheelchair with chronic contractures of the bilateral wrists  PSYCHIATRIC: oriented x3, normal mood and affect    IMAGING:  CHEST X-RAY  (Sept 2023):  FINDINGS: There is air within a large hiatal hernia. Cardiomediastinal contours otherwise appear normal. Vascular volume is normal and the lungs are clear. The costophrenic sulci are dry.     IMPRESSION:  Large hiatal hernia. Otherwise negative chest x-ray.    ASSESSMENT & PLAN  Ms. Willis is a 43-year-old lady with incidentally discovered large hiatal hernia found on chest x-ray.  I reviewed her chest x-ray and discussed the results with the patient and her mother.  I would recommend scheduling her for an EGD to better assess her anatomy.  Clinically, it appears as though this hiatal hernia is asymptomatic.  I would also like her to complete an esophagram if she is able, but her mother is unsure if she would be able to complete the esophagram due to her intellectual disability and cerebral palsy.  After completion of the EGD and esophagram, we can regroup to discuss if any surgical intervention will be necessary.    Serina Wren MD  General, Robotic, and Endoscopic Surgery  [facility]    [address], [address]  [place] [address]  P: [phone]  F: [phone]

## 2024-01-11 ENCOUNTER — ANESTHESIA EVENT (OUTPATIENT)
Dept: GASTROENTEROLOGY | Facility: HOSPITAL | Age: 44
End: 2024-01-11
Payer: MEDICARE

## 2024-01-11 ENCOUNTER — HOSPITAL ENCOUNTER (OUTPATIENT)
Facility: HOSPITAL | Age: 44
Setting detail: HOSPITAL OUTPATIENT SURGERY
Discharge: HOME | End: 2024-01-11
Attending: SURGERY | Admitting: SURGERY
Payer: MEDICARE

## 2024-01-11 ENCOUNTER — ANESTHESIA (OUTPATIENT)
Dept: GASTROENTEROLOGY | Facility: HOSPITAL | Age: 44
End: 2024-01-11
Payer: MEDICARE

## 2024-01-11 VITALS
OXYGEN SATURATION: 99 % | HEIGHT: 60 IN | TEMPERATURE: 97.4 F | DIASTOLIC BLOOD PRESSURE: 104 MMHG | BODY MASS INDEX: 29.45 KG/M2 | RESPIRATION RATE: 20 BRPM | SYSTOLIC BLOOD PRESSURE: 186 MMHG | WEIGHT: 150 LBS | HEART RATE: 134 BPM

## 2024-01-11 DIAGNOSIS — K44.9 HIATAL HERNIA: ICD-10-CM

## 2024-01-11 PROBLEM — K20.90 ESOPHAGITIS: Status: ACTIVE | Noted: 2024-01-11

## 2024-01-11 PROCEDURE — 25810000003 LACTATED RINGERS PER 1000 ML: Performed by: SURGERY

## 2024-01-11 PROCEDURE — 88305 TISSUE EXAM BY PATHOLOGIST: CPT | Performed by: SURGERY

## 2024-01-11 PROCEDURE — 25010000002 ESMOLOL 100 MG/10ML SOLUTION: Performed by: NURSE ANESTHETIST, CERTIFIED REGISTERED

## 2024-01-11 PROCEDURE — 25010000002 PROPOFOL 10 MG/ML EMULSION: Performed by: NURSE ANESTHETIST, CERTIFIED REGISTERED

## 2024-01-11 PROCEDURE — S0260 H&P FOR SURGERY: HCPCS | Performed by: SURGERY

## 2024-01-11 PROCEDURE — 88312 SPECIAL STAINS GROUP 1: CPT | Performed by: SURGERY

## 2024-01-11 PROCEDURE — 43239 EGD BIOPSY SINGLE/MULTIPLE: CPT | Performed by: SURGERY

## 2024-01-11 PROCEDURE — 25010000002 GLYCOPYRROLATE 0.2 MG/ML SOLUTION: Performed by: NURSE ANESTHETIST, CERTIFIED REGISTERED

## 2024-01-11 RX ORDER — LANSOPRAZOLE 30 MG/1
30 TABLET, ORALLY DISINTEGRATING, DELAYED RELEASE ORAL
Qty: 30 TABLET | Refills: 11 | Status: SHIPPED | OUTPATIENT
Start: 2024-01-11

## 2024-01-11 RX ORDER — SODIUM CHLORIDE 0.9 % (FLUSH) 0.9 %
10 SYRINGE (ML) INJECTION AS NEEDED
Status: DISCONTINUED | OUTPATIENT
Start: 2024-01-11 | End: 2024-01-11 | Stop reason: HOSPADM

## 2024-01-11 RX ORDER — ESMOLOL HYDROCHLORIDE 10 MG/ML
INJECTION INTRAVENOUS AS NEEDED
Status: DISCONTINUED | OUTPATIENT
Start: 2024-01-11 | End: 2024-01-11 | Stop reason: SURG

## 2024-01-11 RX ORDER — SODIUM CHLORIDE 0.9 % (FLUSH) 0.9 %
10 SYRINGE (ML) INJECTION EVERY 12 HOURS SCHEDULED
Status: DISCONTINUED | OUTPATIENT
Start: 2024-01-11 | End: 2024-01-11 | Stop reason: HOSPADM

## 2024-01-11 RX ORDER — LIDOCAINE HYDROCHLORIDE 20 MG/ML
INJECTION, SOLUTION INFILTRATION; PERINEURAL AS NEEDED
Status: DISCONTINUED | OUTPATIENT
Start: 2024-01-11 | End: 2024-01-11 | Stop reason: SURG

## 2024-01-11 RX ORDER — SODIUM CHLORIDE, SODIUM LACTATE, POTASSIUM CHLORIDE, CALCIUM CHLORIDE 600; 310; 30; 20 MG/100ML; MG/100ML; MG/100ML; MG/100ML
30 INJECTION, SOLUTION INTRAVENOUS CONTINUOUS PRN
Status: DISCONTINUED | OUTPATIENT
Start: 2024-01-11 | End: 2024-01-11 | Stop reason: HOSPADM

## 2024-01-11 RX ORDER — PROPOFOL 10 MG/ML
VIAL (ML) INTRAVENOUS CONTINUOUS PRN
Status: DISCONTINUED | OUTPATIENT
Start: 2024-01-11 | End: 2024-01-11 | Stop reason: SURG

## 2024-01-11 RX ORDER — SODIUM CHLORIDE 9 MG/ML
40 INJECTION, SOLUTION INTRAVENOUS AS NEEDED
Status: DISCONTINUED | OUTPATIENT
Start: 2024-01-11 | End: 2024-01-11 | Stop reason: HOSPADM

## 2024-01-11 RX ORDER — GLYCOPYRROLATE 0.2 MG/ML
INJECTION INTRAMUSCULAR; INTRAVENOUS AS NEEDED
Status: DISCONTINUED | OUTPATIENT
Start: 2024-01-11 | End: 2024-01-11 | Stop reason: SURG

## 2024-01-11 RX ADMIN — SODIUM CHLORIDE, POTASSIUM CHLORIDE, SODIUM LACTATE AND CALCIUM CHLORIDE 30 ML/HR: 600; 310; 30; 20 INJECTION, SOLUTION INTRAVENOUS at 07:57

## 2024-01-11 RX ADMIN — PROPOFOL 200 MCG/KG/MIN: 10 INJECTION, EMULSION INTRAVENOUS at 08:53

## 2024-01-11 RX ADMIN — ESMOLOL HYDROCHLORIDE 5 MG: 100 INJECTION, SOLUTION INTRAVENOUS at 09:02

## 2024-01-11 RX ADMIN — ESMOLOL HYDROCHLORIDE 10 MG: 100 INJECTION, SOLUTION INTRAVENOUS at 09:15

## 2024-01-11 RX ADMIN — PROPOFOL 80 MG: 10 INJECTION, EMULSION INTRAVENOUS at 08:54

## 2024-01-11 RX ADMIN — LIDOCAINE HYDROCHLORIDE 100 MG: 20 INJECTION, SOLUTION INFILTRATION; PERINEURAL at 08:53

## 2024-01-11 RX ADMIN — GLYCOPYRROLATE 0.2 MG: 0.2 INJECTION INTRAMUSCULAR; INTRAVENOUS at 08:55

## 2024-01-11 NOTE — OP NOTE
Operative Note :  Serina Wren MD      Roya Dollen  1980    Procedure Date: 01/11/24    Pre-op Diagnosis:  Hiatal hernia [K44.9]    Post-Operative Diagnosis:  Finding hiatal hernia  Distal esophagitis with questionable Neville's esophagus    Procedure:   Esophagogastroduodenoscopy with biopsy    Surgeon: Serina Wren MD    Assistant: None    Anesthesia:  MAC (monitored anesthetic care)    Estimated Blood Loss: Minimal    Specimens:   Duodenal biopsy  Antral biopsy  GE junction biopsy    Complications: None    Indications:  The patient is a 43-year-old lady with cerebral palsy who was incidentally discovered to have a large hiatal hernia when a chest x-ray was performed by her PCP.    Findings: GE junction located 30 cm from incisors, diaphragmatic hiatus located at 36 cm from incisors with about 1/3-1/2 of the stomach in the chest    Description of procedure:  The patient was brought to the endoscopy suite and jessica in the left lateral decubitus position.  A surgical timeout was completed.  A bite-block was inserted into the oropharynx.  Continuous propofol anesthesia was administered.  She had some gagging with oxygen desaturation initially so an Ambu bag was used to improve her oxygenation up to 100%.  An upper endoscope was passed through the bite-block to the posterior oropharynx where the vocal cords and epiglottis were grossly normal.  The cervical esophagus was cannulated and the scope passed onward through the full length of the esophagus noting a moderate size hiatal hernia measuring about 6 cm in length with the GE junction located at 30 cm from the incisors and the diaphragmatic hiatus located 36 cm from the incisors.  There was distal esophagitis noted at the level of the GE junction with tongues of salmon-colored mucosa extending up into the lower esophagus consistent with likely Neville's esophagus.  The intra-abdominal portion of stomach was entered and maximally insufflated  showing about two thirds of the stomach down beneath the diaphragm.  The scope was passed through the pylorus entering the duodenal bulb which appeared unremarkable.  The scope was passed into the second portion of the duodenum which was also normal.  Mucosal biopsies were obtained there to test for celiac disease.  The scope was retracted back to the gastric antrum and retroflexed, again demonstrating a sliding hiatal hernia.  Mucosal biopsies were obtained in the gastric antrum to test for H. pylori.  The scope was then retracted back to the level of the GE junction noting no gastric mucosal abnormalities.  At the GE junction mucosal biopsies were obtained to test for Neville's esophagus.  The scope was then retracted out of the mouth and she transferred to the recovery room in stable condition with no further oxygen desaturation.    Serina Wren MD  General, Robotic, and Endoscopic Surgery  [facility]    [address], [address]  [place] [address]  P: [phone]  F: [phone]

## 2024-01-11 NOTE — ANESTHESIA POSTPROCEDURE EVALUATION
Patient: Roya Willis    Procedure Summary       Date: 01/11/24 Room / Location: [facility] ENDOSCOPY 6 / [facility] ENDOSCOPY    Anesthesia Start: 0848 Anesthesia Stop: 0917    Procedure: ESOPHAGOGASTRODUODENOSCOPY with cold biopsies (Esophagus) Diagnosis:       Hiatal hernia      (Hiatal hernia [K44.9])    Surgeons: Serina Wren MD Provider: Rai Dalal MD    Anesthesia Type: MAC ASA Status: 2            Anesthesia Type: MAC    Vitals  Vitals Value Taken Time   /104 01/11/24 0930   Temp     Pulse 134 01/11/24 0934   Resp 20 01/11/24 0930   SpO2 99 % 01/11/24 0933   Vitals shown include unfiled device data.        Post Anesthesia Care and Evaluation    Patient location during evaluation: PACU  Patient participation: complete - patient participated  Level of consciousness: awake and alert  Pain management: adequate    Airway patency: patent  Anesthetic complications: No anesthetic complications    Cardiovascular status: acceptable  Respiratory status: acceptable  Hydration status: acceptable    Comments: ---------------------            01/11/24 0930      ---------------------   BP:     (!) 186/104   Pulse:    (!) 134     Resp:       20        Temp:                 SpO2:       99%      ---------------------

## 2024-01-11 NOTE — H&P
General Surgery  History and Physical    CC: Hiatal hernia     HPI: The patient is a pleasant 43 y.o. year-old lady who presents today with her mother for discussion of an incidentally discovered large hiatal hernia found on a chest x-ray performed in September when she was experiencing wheezing and there was concern she may have aspirated.  The patient has cerebral palsy and is completely nonverbal.  Her mother reports she is able to eat soft food and has never required a feeding tube for nutrition.  She denies any chronic nausea or vomiting and has had no regurgitation of undigested food.  All other history is unable to be obtained since the patient is nonverbal.     Past Medical History:   Cerebral palsy  Epilepsy     Past Surgical History:   Eye surgery  Foot surgery     Medications:   Cetirizine 5 mL daily  Lamictal 25 mg chewable tablet daily  Prevacid 10 mL daily  Keppra 5 mL daily  Depo-Provera intramuscular shot every 3 months  Metronidazole cream twice daily     Allergies: No known drug allergies     Family History: Mother with history of hyperlipidemia and type 2 diabetes, maternal grandfather with history of stroke     Social History: Non-smoker, no regular alcohol use, single and lives with her mother who is her primary caretaker     ROS: Unable to be obtained, patient is nonverbal    Physical Exam:  Vitals:    01/11/24 0740   BP: 102/89   Pulse: (!) 138   Resp: 20   Temp: 97.4 °F (36.3 °C)   SpO2: 100%     Height: 152 cm  Weight: 68 kg  BMI: 29  General: No acute distress, well-nourished & well-developed  HEAD: normocephalic, atraumatic  EYES: normal conjunctiva, sclera anicteric  EARS: grossly normal hearing  NECK: supple, no thyromegaly  CARDIOVASCULAR: regular rate and rhythm  RESPIRATORY: clear to auscultation bilaterally  GASTROINTESTINAL: soft, nontender, non-distended  MUSCULOSKELETAL: Seated in a motorized wheelchair with chronic contractures of the bilateral wrists  PSYCHIATRIC: oriented x3,  normal mood and affect     IMAGING:  CHEST X-RAY (Sept 2023):  FINDINGS: There is air within a large hiatal hernia. Cardiomediastinal contours otherwise appear normal. Vascular volume is normal and the lungs are clear. The costophrenic sulci are dry.     IMPRESSION:  Large hiatal hernia. Otherwise negative chest x-ray.     ASSESSMENT & PLAN  Ms. Willis is a 43-year-old lady with incidentally discovered large hiatal hernia found on chest x-ray.  I would recommend performing an EGD today to better assess her anatomy.  Clinically, it appears as though this hiatal hernia is asymptomatic. Her mother has been counseled on the risks of EGD and has consented to proceed on the patient's behalf who is nonverbal due to her cerebral palsy.    Serina Wren MD  General, Robotic, and Endoscopic Surgery  [facility]    [address], [address]  [place] [address]  P: [phone]  F: [phone]

## 2024-01-11 NOTE — ANESTHESIA PREPROCEDURE EVALUATION
Anesthesia Evaluation     Patient summary reviewed and Nursing notes reviewed   history of anesthetic complications:  PONV               Airway   Mallampati: III  TM distance: >3 FB  Neck ROM: full  Dental      Pulmonary - negative pulmonary ROS   Cardiovascular - negative cardio ROS    Rhythm: regular  Rate: normal        Neuro/Psych  (+) seizures well controlled  GI/Hepatic/Renal/Endo    (+) hiatal hernia, GERD    Musculoskeletal (-) negative ROS    Abdominal    Substance History - negative use     OB/GYN negative ob/gyn ROS         Other                      Anesthesia Plan    ASA 2     MAC     (Cerbral Palsy  Patient's mom present   IV placed pre-op)  intravenous induction     Anesthetic plan, risks, benefits, and alternatives have been provided, discussed and informed consent has been obtained with: patient and mother.    Plan discussed with CRNA.    CODE STATUS:

## 2024-01-11 NOTE — DISCHARGE INSTRUCTIONS
For the next 24 hours patient needs to be with a responsible adult.    For 24 hours DO NOT drive, operate machinery, appliances, drink alcohol, make important decisions or sign legal documents.    Start with a light or bland diet if you are feeling sick to your stomach otherwise advance to regular diet as tolerated.    Follow recommendations on procedure report if provided by your doctor.    Call Dr Wren for problems [phone]    Problems may include but not limited to: large amounts of bleeding, trouble breathing, repeated vomiting, severe unrelieved pain, fever or chills.

## 2024-01-15 LAB
LAB AP CASE REPORT: NORMAL
PATH REPORT.ADDENDUM SPEC: NORMAL
PATH REPORT.FINAL DX SPEC: NORMAL
PATH REPORT.GROSS SPEC: NORMAL

## 2024-01-18 ENCOUNTER — TELEPHONE (OUTPATIENT)
Dept: SURGERY | Facility: CLINIC | Age: 44
End: 2024-01-18
Payer: MEDICARE

## 2024-01-18 NOTE — TELEPHONE ENCOUNTER
----- Message from Serina Wren MD sent at 1/17/2024  3:53 PM EST -----  Ericka, could you call the patient's mother and inquire why her esophagram this week was canceled?  I cannot tell in her chart.  Please let the patient's mother know that the pathology results came back benign from her EGD.  There was mild gastritis and some distal esophagitis due to reflux noted on biopsies but no other abnormalities.  I have sent a prescription for sublingual dissolvable Prevacid, an antacid, to her pharmacy which I would like her to start taking daily.    Thanks,  KERRY

## 2024-02-15 ENCOUNTER — TELEPHONE (OUTPATIENT)
Dept: FAMILY MEDICINE CLINIC | Facility: CLINIC | Age: 44
End: 2024-02-15
Payer: MEDICARE

## 2024-02-16 ENCOUNTER — HOSPITAL ENCOUNTER (OUTPATIENT)
Dept: GENERAL RADIOLOGY | Facility: HOSPITAL | Age: 44
Discharge: HOME | End: 2024-02-16
Payer: MEDICARE

## 2024-02-16 DIAGNOSIS — K44.9 HIATAL HERNIA: ICD-10-CM

## 2024-02-16 DIAGNOSIS — G80.8 OTHER CEREBRAL PALSY: ICD-10-CM

## 2024-02-16 PROCEDURE — 74221 X-RAY XM ESOPHAGUS 2CNTRST: CPT

## 2024-02-16 RX ADMIN — BARIUM SULFATE 135 ML: 980 POWDER, FOR SUSPENSION ORAL at 10:20

## 2024-02-16 RX ADMIN — ANTACID/ANTIFLATULENT 1 PACKET: 380; 550; 10; 10 GRANULE, EFFERVESCENT ORAL at 10:15

## 2024-02-16 RX ADMIN — BARIUM SULFATE 183 ML: 960 POWDER, FOR SUSPENSION ORAL at 10:26

## 2024-02-21 ENCOUNTER — TELEPHONE (OUTPATIENT)
Dept: SURGERY | Facility: CLINIC | Age: 44
End: 2024-02-21
Payer: MEDICARE

## 2024-02-21 ENCOUNTER — CLINICAL SUPPORT (OUTPATIENT)
Dept: FAMILY MEDICINE CLINIC | Facility: CLINIC | Age: 44
End: 2024-02-21
Payer: MEDICARE

## 2024-02-21 DIAGNOSIS — N94.89 MENSTRUAL SUPPRESSION: Primary | ICD-10-CM

## 2024-02-21 RX ORDER — MEDROXYPROGESTERONE ACETATE 150 MG/ML
150 INJECTION, SUSPENSION INTRAMUSCULAR ONCE
Status: COMPLETED | OUTPATIENT
Start: 2024-02-21 | End: 2024-02-21

## 2024-02-21 RX ORDER — MEDROXYPROGESTERONE ACETATE 150 MG/ML
150 INJECTION, SUSPENSION INTRAMUSCULAR ONCE
Status: DISCONTINUED | OUTPATIENT
Start: 2024-02-21 | End: 2024-02-21

## 2024-02-21 RX ADMIN — MEDROXYPROGESTERONE ACETATE 150 MG: 150 INJECTION, SUSPENSION INTRAMUSCULAR at 11:04

## 2024-02-21 NOTE — TELEPHONE ENCOUNTER
I called Roya's mother, Kenisha, who is her power of  and caregiver.  I left a voicemail on her cell phone regarding the findings from Roya's recent esophagram.  This demonstrated a moderate size hiatal hernia with no evidence for gastric volvulus or obstruction.  I would recommend we leave her hiatal hernia alone given her multitude of other medical issues with cerebral palsy epilepsy, etc.  I would hate to put her through the risks of the surgery if this hiatal hernia is really not causing her any troubles currently.  Her mother can call me back in the future if she starts to develop any food intolerance or nausea/vomiting with eating.

## 2024-05-06 NOTE — PROGRESS NOTES
The ABCs of the Annual Wellness Visit  Subsequent Medicare Wellness Visit    Subjective    Roya Willis is a 43 y.o. female who presents for a Subsequent Medicare Wellness Visit.    The following portions of the patient's history were reviewed and   updated as appropriate: allergies, current medications, past family history, past medical history, past social history, past surgical history, and problem list. Her mother answers questions on her behalf.     Compared to one year ago, the patient feels her physical   health is the same.    Compared to one year ago, the patient feels her mental   health is better.    Recent Hospitalizations:  She was not admitted to the hospital during the last year.     Current Medical Providers:  Patient Care Team:  Daysi Ramirez MD as PCP - General (Internal Medicine)  Dr. Ellen Toure, neurology  Dr. Serina Wren, surgery  Cannot can recall name of opho    Outpatient Medications Prior to Visit   Medication Sig Dispense Refill   • lansoprazole (Prevacid SoluTab) 30 MG Tablet Delayed Release Dispersible disintegrating tablet Take 1 tablet by mouth Every Morning. 30 tablet 11   • medroxyPROGESTERone Acetate (Depo-Provera) 150 MG/ML suspension prefilled syringe Inject 1 mL into the appropriate muscle as directed by prescriber Every 3 (Three) Months. Specifically, give every 13 weeks 1 mL 3   • metroNIDAZOLE (METROCREAM) 0.75 % cream Apply 1 application topically to the appropriate area as directed 2 (Two) Times a Day. 45 g 6   • carBAMazepine (TEGretol) 200 MG tablet Take 1 tablet by mouth 3 (Three) Times a Day.     • lamoTRIgine (LaMICtal) 25 MG chewable tablet chewable tablet Chew 4 tablets 2 (Two) Times a Day.       No facility-administered medications prior to visit.     No opioid medication identified on active medication list. I have reviewed chart for other potential  high risk medication/s and harmful drug interactions in the elderly.      Aspirin is not on active  "medication list.  Aspirin use is not indicated based on review of current medical condition/s. Risk of harm outweighs potential benefits.  .    Patient Active Problem List   Diagnosis   • Other cerebral palsy   • Rosacea   • Eczema   • Epilepsy   • Intellectual disability   • Visual impairment   • Menstrual suppression   • Routine health maintenance   • Hiatal hernia   • Esophagitis   • Wheezing     Advance Care Planning   Advance Care Planning     Advance Directive is on file.  ACP discussion was held with the patient during this visit. Patient has an advance directive in EMR which is still valid.      Objective    Vitals:    05/15/24 1309   BP: 134/88   BP Location: Left arm   Patient Position: Sitting   Cuff Size: Large Adult   Pulse: 78   SpO2: 100%   Height: 152.4 cm (60\")     Estimated body mass index is 29.29 kg/m² as calculated from the following:    Height as of this encounter: 152.4 cm (60\").    Weight as of 24: 68 kg (150 lb).      Does the patient have evidence of cognitive impairment? Yes          HEALTH RISK ASSESSMENT    Smoking Status:  Social History     Tobacco Use   Smoking Status Never   Smokeless Tobacco Never     Alcohol Consumption:  Social History     Substance and Sexual Activity   Alcohol Use Never     Fall Risk Screen:    STEADI Fall Risk Assessment was completed, and patient is at LOW risk for falls.Assessment completed on:5/15/2024    Depression Screenin/15/2024     1:10 PM   PHQ-2/PHQ-9 Depression Screening   Little Interest or Pleasure in Doing Things 0-->not at all   Feeling Down, Depressed or Hopeless 0-->not at all   PHQ-9: Brief Depression Severity Measure Score 0       Health Habits and Functional and Cognitive Screenin/15/2024     2:00 PM   Functional & Cognitive Status   Do you have difficulty preparing food and eating? Yes   Do you have difficulty bathing yourself, getting dressed or grooming yourself? Yes   Do you have difficulty using the toilet? No "   Do you have difficulty moving around from place to place? Yes   Do you have trouble with steps or getting out of a bed or a chair? Yes   Current Diet Well Balanced Diet   Dental Exam Up to date   Eye Exam Up to date   Exercise (times per week) 0 times per week   Current Exercises Include No Regular Exercise   Do you need help using the phone?  Yes   Are you deaf or do you have serious difficulty hearing?  No   Do you need help to go to places out of walking distance? Yes   Do you need help shopping? Yes   Do you need help preparing meals?  Yes   Do you need help with housework?  Yes   Do you need help with laundry? Yes   Do you need help taking your medications? Yes   Do you need help managing money? Yes   Do you ever drive or ride in a car without wearing a seat belt? No   Have you felt unusual stress, anger or loneliness in the last month? No   Who do you live with? Other   If you need help, do you have trouble finding someone available to you? No   Have you been bothered in the last four weeks by sexual problems? No   Do you have difficulty concentrating, remembering or making decisions? Yes       Age-appropriate Screening Schedule:  Refer to the list below for future screening recommendations based on patient's age, sex and/or medical conditions. Orders for these recommended tests are listed in the plan section. The patient has been provided with a written plan.    Health Maintenance   Topic Date Due   • BMI FOLLOWUP  Never done   • COVID-19 Vaccine (1 - 2023-24 season) Never done   • INFLUENZA VACCINE  08/01/2024   • ANNUAL WELLNESS VISIT  05/15/2025   • TDAP/TD VACCINES (2 - Td or Tdap) 03/21/2033   • Pneumococcal Vaccine 0-64  Aged Out   • HEPATITIS C SCREENING  Discontinued   • MAMMOGRAM  Discontinued   • PAP SMEAR  Discontinued          CMS Preventative Services Quick Reference  Risk Factors Identified During Encounter  Immunizations Discussed/Encouraged: Prevnar 20 (Pneumococcal 20-valent conjugate) and  "COVID19  The above risks/problems have been discussed with the patient.  Pertinent information has been shared with the patient in the After Visit Summary.  An After Visit Summary and PPPS were made available to the patient.    Follow Up:   Next Medicare Wellness visit to be scheduled in 1 year.       Additional E&M Note during same encounter follows:  Patient has multiple medical problems which are significant and separately identifiable that require additional work above and beyond the Medicare Wellness Visit.      Chief Complaint  Medicare Wellness-subsequent    Subjective        HPI  Roya Willis is also being seen today for:    Due for DepoProvera today. Having some breakthrough bleeding.    Was having mood swings on Keppra, crying, laughing, angry, no wanting to eat. Went back on Tegretol 100 mg 6x, 200 mg tid. There was concern for osteoporosis.  Currently in day program at [facility] twice weekly.    Needs new manual wheelchair    Not really wheezing or coughing  Stuffy nose on  Children's allegra            Objective   Vital Signs:  /88 (BP Location: Left arm, Patient Position: Sitting, Cuff Size: Large Adult)   Pulse 78   Ht 152.4 cm (60\")   SpO2 100%   BMI 29.29 kg/m²     Physical Exam  Constitutional:       General: She is not in acute distress.     Appearance: Normal appearance. She is obese. She is not toxic-appearing.   HENT:      Head: Normocephalic and atraumatic.      Mouth/Throat:      Mouth: Mucous membranes are moist.   Eyes:      General: No scleral icterus.     Conjunctiva/sclera: Conjunctivae normal.   Cardiovascular:      Rate and Rhythm: Normal rate and regular rhythm.      Heart sounds: Normal heart sounds. No murmur heard.     No friction rub. No gallop.   Pulmonary:      Effort: Pulmonary effort is normal. No respiratory distress.      Comments: Exam limited by pt's ability to participate.  Occ wheeze but improved from prior    Musculoskeletal:         General: No swelling, " tenderness or deformity. Normal range of motion.      Cervical back: Normal range of motion and neck supple.      Comments: In wheelchair, has flexion contractures BLE   Skin:     General: Skin is warm and dry.      Findings: No lesion or rash.   Neurological:      General: No focal deficit present.      Mental Status: She is alert.      Comments: Communicates using limited sign language, limited verbal interactions   Psychiatric:      Comments: Calm and cooperative          The following data was reviewed by: Daysi Ramirez MD on 05/15/2024:          UPPER GI ENDOSCOPY (01/11/2024 08:43)   FL ESOPHAGRAM DOUBLE CONTRAST (02/16/2024 10:28)      Assessment and Plan   Diagnoses and all orders for this visit:    1. Encounter for subsequent annual wellness visit (AWV) in Medicare patient (Primary)    2. Need for vaccination  -     Pneumococcal Conjugate Vaccine 20-Valent (PCV20)    3. Menstrual suppression  Assessment & Plan:  Due for Depo-Provera today for continued menstrual supression  Pt's mother Kenisha wonders if she could be evaluated by gyn as she has been having some breakthrough bleeding more frequently. Now that she is back on Tegretol, wonder if there is potential interaction that could reduce efficacy. Unfortunately, cannot stop either medication at this time.   If she had an exam, would need to be under anesthesia  Also needs dental exam under anesthesia  Most likely to be coordinated at . Will reach out to colleague at  and likely will refer there.     Orders:  -     Discontinue: medroxyPROGESTERone (DEPO-PROVERA) injection 150 mg  -     MedroxyPROGESTERone Acetate (DEPO-PROVERA) injection 150 mg  -     Ambulatory Referral to Gynecology    4. Other cerebral palsy  Assessment & Plan:  Currently in day program at [facility] which has been great for her  Needs new wheelchair (manual wheelchair). Mom will check with PT at [facility] to find out where they recommend we order it from and what type.      5.  Nonintractable epilepsy without status epilepticus, unspecified epilepsy type  Assessment & Plan:  Follows with Dr. Toure  Had terrible emotional reaction to Keppra, Lamictal also not a good fit. Back on Tegretol.       6. Hiatal hernia  Assessment & Plan:  Has been evaluated by Dr. Wren  Ultimately, after extensive work up, decision made to monitor  Pt unable to take Lansoprazole  Still with some wheezing, no worse than baseline, normal O2 sats  See prior notes for additional discussion      7. Routine health maintenance  Assessment & Plan:  As far as tests/procedures like mammograms, Pap smears, colon cancer screening, her mother does not believe she would be able to tolerate these tests much less interventions for these problems.  Thus, we will not order. However, she is overdue for dental care--likely would need anesthesia to tolerate exam/cleaning/procedures. I have investigated options previously. [facility] option but long waiting list. Dental School also an option, mom to call. [id number] today, rec updated COVID vaccine.  Had labs with Dr. Toure in 7/2023. No need to update now but can get repeat labs at next visit.       8. Intellectual disability    9. Visual impairment    10. Breakthrough bleeding on Depo-Provera  -     Ambulatory Referral to Gynecology           Follow Up   Return in about 6 months (around 11/15/2024), or if symptoms worsen or fail to improve.  Patient was given instructions and counseling regarding her condition or for health maintenance advice. Please see specific information pulled into the AVS if appropriate.

## 2024-05-15 ENCOUNTER — TELEPHONE (OUTPATIENT)
Dept: FAMILY MEDICINE CLINIC | Facility: CLINIC | Age: 44
End: 2024-05-15

## 2024-05-15 ENCOUNTER — OFFICE VISIT (OUTPATIENT)
Dept: FAMILY MEDICINE CLINIC | Facility: CLINIC | Age: 44
End: 2024-05-15
Payer: MEDICARE

## 2024-05-15 VITALS
OXYGEN SATURATION: 100 % | SYSTOLIC BLOOD PRESSURE: 134 MMHG | DIASTOLIC BLOOD PRESSURE: 88 MMHG | HEIGHT: 60 IN | BODY MASS INDEX: 29.29 KG/M2 | HEART RATE: 78 BPM

## 2024-05-15 DIAGNOSIS — N94.89 MENSTRUAL SUPPRESSION: ICD-10-CM

## 2024-05-15 DIAGNOSIS — Z00.00 ROUTINE HEALTH MAINTENANCE: ICD-10-CM

## 2024-05-15 DIAGNOSIS — H54.7 VISUAL IMPAIRMENT: ICD-10-CM

## 2024-05-15 DIAGNOSIS — G40.909 NONINTRACTABLE EPILEPSY WITHOUT STATUS EPILEPTICUS, UNSPECIFIED EPILEPSY TYPE: ICD-10-CM

## 2024-05-15 DIAGNOSIS — F79 INTELLECTUAL DISABILITY: ICD-10-CM

## 2024-05-15 DIAGNOSIS — Z23 NEED FOR VACCINATION: ICD-10-CM

## 2024-05-15 DIAGNOSIS — Z00.00 ENCOUNTER FOR SUBSEQUENT ANNUAL WELLNESS VISIT (AWV) IN MEDICARE PATIENT: Primary | ICD-10-CM

## 2024-05-15 DIAGNOSIS — G80.8 OTHER CEREBRAL PALSY: ICD-10-CM

## 2024-05-15 DIAGNOSIS — K44.9 HIATAL HERNIA: ICD-10-CM

## 2024-05-15 DIAGNOSIS — N92.1 BREAKTHROUGH BLEEDING ON DEPO-PROVERA: ICD-10-CM

## 2024-05-15 PROBLEM — R06.2 WHEEZING: Status: ACTIVE | Noted: 2024-05-15

## 2024-05-15 PROCEDURE — 1159F MED LIST DOCD IN RCRD: CPT | Performed by: INTERNAL MEDICINE

## 2024-05-15 PROCEDURE — 1160F RVW MEDS BY RX/DR IN RCRD: CPT | Performed by: INTERNAL MEDICINE

## 2024-05-15 PROCEDURE — G0009 ADMIN PNEUMOCOCCAL VACCINE: HCPCS | Performed by: INTERNAL MEDICINE

## 2024-05-15 PROCEDURE — 90677 PCV20 VACCINE IM: CPT | Performed by: INTERNAL MEDICINE

## 2024-05-15 PROCEDURE — 99214 OFFICE O/P EST MOD 30 MIN: CPT | Performed by: INTERNAL MEDICINE

## 2024-05-15 PROCEDURE — G0439 PPPS, SUBSEQ VISIT: HCPCS | Performed by: INTERNAL MEDICINE

## 2024-05-15 PROCEDURE — 1170F FXNL STATUS ASSESSED: CPT | Performed by: INTERNAL MEDICINE

## 2024-05-15 RX ORDER — CARBAMAZEPINE 200 MG/1
200 TABLET ORAL 3 TIMES DAILY
COMMUNITY

## 2024-05-15 RX ORDER — MEDROXYPROGESTERONE ACETATE 150 MG/ML
150 INJECTION, SUSPENSION INTRAMUSCULAR ONCE
Status: COMPLETED | OUTPATIENT
Start: 2024-05-15 | End: 2024-05-15

## 2024-05-15 RX ORDER — MEDROXYPROGESTERONE ACETATE 150 MG/ML
150 INJECTION, SUSPENSION INTRAMUSCULAR ONCE
Status: DISCONTINUED | OUTPATIENT
Start: 2024-05-15 | End: 2024-05-15

## 2024-05-15 RX ADMIN — MEDROXYPROGESTERONE ACETATE 150 MG: 150 INJECTION, SUSPENSION INTRAMUSCULAR at 14:24

## 2024-05-15 NOTE — TELEPHONE ENCOUNTER
Notes say 6 mo fu up but pt requested nurse visit for injection on July 31. If pt will have to have ov for injection it can be changed

## 2024-05-15 NOTE — ASSESSMENT & PLAN NOTE
Due for Depo-Provera today for continued menstrual supression  Pt's mother Kenisha wonders if she could be evaluated by gyn as she has been having some breakthrough bleeding more frequently. Now that she is back on Tegretol, wonder if there is potential interaction that could reduce efficacy. Unfortunately, cannot stop either medication at this time.   If she had an exam, would need to be under anesthesia  Also needs dental exam under anesthesia  Most likely to be coordinated at . Will reach out to colleague at  and likely will refer there.

## 2024-05-15 NOTE — ASSESSMENT & PLAN NOTE
Has been evaluated by Dr. Wren  Ultimately, after extensive work up, decision made to monitor  Pt unable to take Lansoprazole  Still with some wheezing, no worse than baseline, normal O2 sats  See prior notes for additional discussion

## 2024-05-15 NOTE — ASSESSMENT & PLAN NOTE
Currently in day program at [facility] which has been great for her  Needs new wheelchair (manual wheelchair). Mom will check with PT at [facility] to find out where they recommend we order it from and what type.

## 2024-07-31 ENCOUNTER — CLINICAL SUPPORT (OUTPATIENT)
Dept: FAMILY MEDICINE CLINIC | Facility: CLINIC | Age: 44
End: 2024-07-31
Payer: MEDICARE

## 2024-07-31 DIAGNOSIS — N94.89 MENSTRUAL SUPPRESSION: Primary | ICD-10-CM

## 2024-07-31 RX ORDER — MEDROXYPROGESTERONE ACETATE 150 MG/ML
150 INJECTION, SUSPENSION INTRAMUSCULAR ONCE
Status: COMPLETED | OUTPATIENT
Start: 2024-07-31 | End: 2024-07-31

## 2024-07-31 RX ADMIN — MEDROXYPROGESTERONE ACETATE 150 MG: 150 INJECTION, SUSPENSION INTRAMUSCULAR at 11:40

## 2024-08-02 NOTE — PROGRESS NOTES
"Chief Complaint  Needs a new wheelchair    Subjective        HPI   Roya presents to [facility] for a visit as she needs a new wheelchair. She is accompanied by her mother Kenisha.    She needs a new wheelchair as the one she has is worn out. The order has already been submitted to [employer]  Kenisha is hoping for one that is a little lighter as mom has to pick it up and get in and out of car  At baseline, pt requires wheelchair to get around  She does not walk, but does crawl from room to room at times  During visit, we called the company to see what requirements were necessary for new chair. They need the following:  Progress note, face sheet/demographic sheet  Referral/order/prescription for a manual chair  Insurance card (front and back)  Needs OV within 60 days  Fax to [phone]    Has appt with gyn coming up in next few mos. Remains on DepoProvera for menstrual supression, needs refill.    Continues to follow with Dr. Toure for seizure care    Has coughed off and on all summer, has been taking mucinex prn        Objective   Vital Signs:  Vitals:    08/07/24 1051   BP: 128/88   BP Location: Right arm   Patient Position: Sitting   Cuff Size: Large Adult   Pulse: 89   SpO2: 98%   Height: 152.4 cm (60\")          Physical Exam  Constitutional:       General: She is not in acute distress.     Appearance: Normal appearance. She is obese. She is not toxic-appearing.   HENT:      Head: Normocephalic and atraumatic.      Mouth/Throat:      Mouth: Mucous membranes are moist.   Eyes:      General: No scleral icterus.     Conjunctiva/sclera: Conjunctivae normal.   Cardiovascular:      Rate and Rhythm: Normal rate and regular rhythm.      Heart sounds: Normal heart sounds. No murmur heard.     No friction rub. No gallop.   Pulmonary:      Effort: Pulmonary effort is normal. No respiratory distress.      Comments: Exam limited by pt's ability to participate.  Occ wheeze but " improved from prior    Musculoskeletal:         General: No swelling, tenderness or deformity. Normal range of motion.      Cervical back: Normal range of motion and neck supple.      Comments: In wheelchair, has flexion contractures BLE   Skin:     General: Skin is warm and dry.      Coloration: Skin is not jaundiced.      Findings: No lesion or rash.   Neurological:      General: No focal deficit present.      Mental Status: She is alert and oriented to person, place, and time.      Comments: Communicates using limited sign language, limited verbal interactions   Psychiatric:         Mood and Affect: Mood normal.         Behavior: Behavior normal.         Judgment: Judgment normal.      Comments: Calm and cooperative          Result Review :     The following data was reviewed by: Daysi Ramirez MD on 08/07/2024:  COMPREHENSIVE METABOLIC PANEL (07/16/2024 12:15)  CBC AND DIFFERENTIAL (07/16/2024 12:15)         Assessment and Plan    Diagnoses and all orders for this visit:    1. Other cerebral palsy (Primary)  Assessment & Plan:  Currently in day program at [facility] which has been great for her  Needs new wheelchair (manual wheelchair). Pt absolutely would benefit from new manual wheelchair, unable to ambulate on her own at baseline. I placed order and will ask staff to send required information/documentation (see HPI).     Orders:  -     Miscellaneous DME    2. Debility  -     Miscellaneous DME    3. Menstrual suppression  Assessment & Plan:  Remains on Depoprovera  Pt's mother Kenisha wonders if she could be evaluated by gyn as she has been having some breakthrough bleeding more frequently. I referred her to gyn last visit. Now that she is back on Tegretol, wonder if there is potential interaction that could reduce efficacy. Unfortunately, cannot stop either medication at this time.   If she had an exam, would need to be under anesthesia    Orders:  -     medroxyPROGESTERone Acetate (Depo-Provera) 150 MG/ML  suspension prefilled syringe; Inject 1 mL into the appropriate muscle as directed by prescriber Every 3 (Three) Months.  Dispense: 1 mL; Refill: 3    4. Nonintractable epilepsy without status epilepticus, unspecified epilepsy type  Assessment & Plan:  Follows with Dr. Toure  Had terrible emotional reaction to Keppra, Lamictal also not a good fit. Back on Tegretol.   Recent CBC and CMP normal      5. Hiatal hernia  Assessment & Plan:  Has been evaluated by Dr. Wren  Ultimately, after extensive work up, decision made to monitor  Still with some wheezing/cough, no worse than baseline, normal O2 sats  See prior notes for additional discussion          Follow Up   No follow-ups on file.  Patient was given instructions and counseling regarding her condition or for health maintenance advice. Please see specific information pulled into the AVS if appropriate.     I notified the patient that I will be transitioning out of my role as a primary care physician at [facility] effective mid-September. Patient was given a letter with a list of [facility] PCPs who are taking new patients. I recommended that they establish care with one of them to ensure continuity of care.

## 2024-08-07 ENCOUNTER — TELEPHONE (OUTPATIENT)
Dept: FAMILY MEDICINE CLINIC | Facility: CLINIC | Age: 44
End: 2024-08-07

## 2024-08-07 ENCOUNTER — OFFICE VISIT (OUTPATIENT)
Dept: FAMILY MEDICINE CLINIC | Facility: CLINIC | Age: 44
End: 2024-08-07
Payer: MEDICARE

## 2024-08-07 VITALS
BODY MASS INDEX: 29.29 KG/M2 | OXYGEN SATURATION: 98 % | SYSTOLIC BLOOD PRESSURE: 128 MMHG | HEIGHT: 60 IN | HEART RATE: 89 BPM | DIASTOLIC BLOOD PRESSURE: 88 MMHG

## 2024-08-07 DIAGNOSIS — K44.9 HIATAL HERNIA: ICD-10-CM

## 2024-08-07 DIAGNOSIS — N94.89 MENSTRUAL SUPPRESSION: ICD-10-CM

## 2024-08-07 DIAGNOSIS — G80.8 OTHER CEREBRAL PALSY: Primary | ICD-10-CM

## 2024-08-07 DIAGNOSIS — R53.81 DEBILITY: ICD-10-CM

## 2024-08-07 DIAGNOSIS — G40.909 NONINTRACTABLE EPILEPSY WITHOUT STATUS EPILEPTICUS, UNSPECIFIED EPILEPSY TYPE: ICD-10-CM

## 2024-08-07 PROCEDURE — 1160F RVW MEDS BY RX/DR IN RCRD: CPT | Performed by: INTERNAL MEDICINE

## 2024-08-07 PROCEDURE — 99214 OFFICE O/P EST MOD 30 MIN: CPT | Performed by: INTERNAL MEDICINE

## 2024-08-07 PROCEDURE — 1159F MED LIST DOCD IN RCRD: CPT | Performed by: INTERNAL MEDICINE

## 2024-08-07 RX ORDER — MEDROXYPROGESTERONE ACETATE 150 MG/ML
150 INJECTION, SUSPENSION INTRAMUSCULAR
Qty: 1 ML | Refills: 3 | Status: SHIPPED | OUTPATIENT
Start: 2024-08-07

## 2024-08-07 NOTE — TELEPHONE ENCOUNTER
Please fax the following information to [phone], [employer]:    Most recent progress note  Face sheet/demographic sheet  Referral/order/prescription for a manual chair (ordered in epic via DME order)  Insurance card (front and back)--OK if pt's mother sends instead if needed

## 2024-08-07 NOTE — ASSESSMENT & PLAN NOTE
Currently in day program at [facility] which has been great for her  Needs new wheelchair (manual wheelchair). Pt absolutely would benefit from new manual wheelchair, unable to ambulate on her own at baseline. I placed order and will ask staff to send required information/documentation (see HPI).

## 2024-08-07 NOTE — ASSESSMENT & PLAN NOTE
Has been evaluated by Dr. Wren  Ultimately, after extensive work up, decision made to monitor  Still with some wheezing/cough, no worse than baseline, normal O2 sats  See prior notes for additional discussion

## 2024-08-07 NOTE — ASSESSMENT & PLAN NOTE
Follows with Dr. Toure  Had terrible emotional reaction to Keppra, Lamictal also not a good fit. Back on Tegretol.   Recent CBC and CMP normal

## 2024-08-07 NOTE — ASSESSMENT & PLAN NOTE
Remains on Depoprovera  Pt's mother Kenisha wonders if she could be evaluated by gyn as she has been having some breakthrough bleeding more frequently. I referred her to gyn last visit. Now that she is back on Tegretol, wonder if there is potential interaction that could reduce efficacy. Unfortunately, cannot stop either medication at this time.   If she had an exam, would need to be under anesthesia

## 2024-08-09 NOTE — TELEPHONE ENCOUNTER
EVERYTHING HAS BEEN PRINTED BU THE REFERRAL FOR MANUAL CHAIR. PLEASE ADVISE WHERE WE CAN LOCATE THIS PAPERWORK

## 2024-09-15 ENCOUNTER — ON CAMPUS - OUTPATIENT (OUTPATIENT)
Age: 44
End: 2024-09-15
Payer: MEDICARE

## 2024-09-15 ENCOUNTER — APPOINTMENT (OUTPATIENT)
Dept: CT IMAGING | Facility: HOSPITAL | Age: 44
End: 2024-09-15
Payer: MEDICARE

## 2024-09-15 ENCOUNTER — ON CAMPUS - OUTPATIENT (OUTPATIENT)
Dept: URBAN - METROPOLITAN AREA HOSPITAL 114 | Facility: HOSPITAL | Age: 44
End: 2024-09-15
Payer: MEDICARE

## 2024-09-15 ENCOUNTER — HOSPITAL ENCOUNTER (OUTPATIENT)
Facility: HOSPITAL | Age: 44
Setting detail: OBSERVATION
Discharge: HOME | End: 2024-09-16
Attending: EMERGENCY MEDICINE | Admitting: STUDENT IN AN ORGANIZED HEALTH CARE EDUCATION/TRAINING PROGRAM
Payer: MEDICARE

## 2024-09-15 DIAGNOSIS — A08.11 ACUTE GASTROENTEROPATHY DUE TO NORWALK AGENT: ICD-10-CM

## 2024-09-15 DIAGNOSIS — K52.9 COLITIS: ICD-10-CM

## 2024-09-15 DIAGNOSIS — K52.89 OTHER SPECIFIED NONINFECTIVE GASTROENTERITIS AND COLITIS: ICD-10-CM

## 2024-09-15 DIAGNOSIS — R11.2 NAUSEA WITH VOMITING, UNSPECIFIED: ICD-10-CM

## 2024-09-15 DIAGNOSIS — R11.2 NAUSEA AND VOMITING, UNSPECIFIED VOMITING TYPE: Primary | ICD-10-CM

## 2024-09-15 DIAGNOSIS — G80.9 CEREBRAL PALSY, UNSPECIFIED TYPE: ICD-10-CM

## 2024-09-15 DIAGNOSIS — R19.7 DIARRHEA OF PRESUMED INFECTIOUS ORIGIN: ICD-10-CM

## 2024-09-15 LAB
ADV 40+41 DNA STL QL NAA+NON-PROBE: NOT DETECTED
ALBUMIN SERPL-MCNC: 3.7 G/DL (ref 3.5–5.2)
ALBUMIN/GLOB SERPL: 1.1 G/DL
ALP SERPL-CCNC: 96 U/L (ref 39–117)
ALT SERPL W P-5'-P-CCNC: 13 U/L (ref 1–33)
ANION GAP SERPL CALCULATED.3IONS-SCNC: 13.7 MMOL/L (ref 5–15)
AST SERPL-CCNC: 15 U/L (ref 1–32)
ASTRO TYP 1-8 RNA STL QL NAA+NON-PROBE: NOT DETECTED
BACTERIA UR QL AUTO: ABNORMAL /HPF
BASOPHILS # BLD AUTO: 0.01 10*3/MM3 (ref 0–0.2)
BASOPHILS NFR BLD AUTO: 0.2 % (ref 0–1.5)
BILIRUB SERPL-MCNC: 0.2 MG/DL (ref 0–1.2)
BILIRUB UR QL STRIP: NEGATIVE
BUN SERPL-MCNC: 8 MG/DL (ref 6–20)
BUN/CREAT SERPL: 15.7 (ref 7–25)
C CAYETANENSIS DNA STL QL NAA+NON-PROBE: NOT DETECTED
C COLI+JEJ+UPSA DNA STL QL NAA+NON-PROBE: NOT DETECTED
C DIFF TOX GENS STL QL NAA+PROBE: NEGATIVE
CALCIUM SPEC-SCNC: 8.8 MG/DL (ref 8.6–10.5)
CHLORIDE SERPL-SCNC: 106 MMOL/L (ref 98–107)
CLARITY UR: ABNORMAL
CO2 SERPL-SCNC: 19.3 MMOL/L (ref 22–29)
COLOR UR: YELLOW
CREAT SERPL-MCNC: 0.51 MG/DL (ref 0.57–1)
CRYPTOSP DNA STL QL NAA+NON-PROBE: NOT DETECTED
D-LACTATE SERPL-SCNC: 2 MMOL/L (ref 0.5–2)
DEPRECATED RDW RBC AUTO: 45.1 FL (ref 37–54)
E HISTOLYT DNA STL QL NAA+NON-PROBE: NOT DETECTED
EAEC PAA PLAS AGGR+AATA ST NAA+NON-PRB: NOT DETECTED
EC STX1+STX2 GENES STL QL NAA+NON-PROBE: NOT DETECTED
EGFRCR SERPLBLD CKD-EPI 2021: 118.9 ML/MIN/1.73
EOSINOPHIL # BLD AUTO: 0 10*3/MM3 (ref 0–0.4)
EOSINOPHIL NFR BLD AUTO: 0 % (ref 0.3–6.2)
EPEC EAE GENE STL QL NAA+NON-PROBE: NOT DETECTED
ERYTHROCYTE [DISTWIDTH] IN BLOOD BY AUTOMATED COUNT: 14.3 % (ref 12.3–15.4)
ETEC LTA+ST1A+ST1B TOX ST NAA+NON-PROBE: NOT DETECTED
G LAMBLIA DNA STL QL NAA+NON-PROBE: NOT DETECTED
GLOBULIN UR ELPH-MCNC: 3.5 GM/DL
GLUCOSE SERPL-MCNC: 173 MG/DL (ref 65–99)
GLUCOSE UR STRIP-MCNC: ABNORMAL MG/DL
HCG SERPL QL: NEGATIVE
HCT VFR BLD AUTO: 37.3 % (ref 34–46.6)
HCT VFR BLD AUTO: 39.3 % (ref 34–46.6)
HGB BLD-MCNC: 12.3 G/DL (ref 12–15.9)
HGB BLD-MCNC: 12.7 G/DL (ref 12–15.9)
HGB UR QL STRIP.AUTO: ABNORMAL
HOLD SPECIMEN: NORMAL
HYALINE CASTS UR QL AUTO: ABNORMAL /LPF
IMM GRANULOCYTES # BLD AUTO: 0.02 10*3/MM3 (ref 0–0.05)
IMM GRANULOCYTES NFR BLD AUTO: 0.5 % (ref 0–0.5)
KETONES UR QL STRIP: ABNORMAL
LACTOFERRIN STL QL LA: POSITIVE
LEUKOCYTE ESTERASE UR QL STRIP.AUTO: ABNORMAL
LIPASE SERPL-CCNC: 17 U/L (ref 13–60)
LYMPHOCYTES # BLD AUTO: 0.45 10*3/MM3 (ref 0.7–3.1)
LYMPHOCYTES NFR BLD AUTO: 10.3 % (ref 19.6–45.3)
MCH RBC QN AUTO: 27.9 PG (ref 26.6–33)
MCHC RBC AUTO-ENTMCNC: 32.3 G/DL (ref 31.5–35.7)
MCV RBC AUTO: 86.4 FL (ref 79–97)
MONOCYTES # BLD AUTO: 0.43 10*3/MM3 (ref 0.1–0.9)
MONOCYTES NFR BLD AUTO: 9.9 % (ref 5–12)
NEUTROPHILS NFR BLD AUTO: 3.45 10*3/MM3 (ref 1.7–7)
NEUTROPHILS NFR BLD AUTO: 79.1 % (ref 42.7–76)
NITRITE UR QL STRIP: NEGATIVE
NOROVIRUS GI+II RNA STL QL NAA+NON-PROBE: DETECTED
NRBC BLD AUTO-RTO: 0 /100 WBC (ref 0–0.2)
P SHIGELLOIDES DNA STL QL NAA+NON-PROBE: NOT DETECTED
PH UR STRIP.AUTO: 6.5 [PH] (ref 5–8)
PLATELET # BLD AUTO: 274 10*3/MM3 (ref 140–450)
PMV BLD AUTO: 9.7 FL (ref 6–12)
POTASSIUM SERPL-SCNC: 3.3 MMOL/L (ref 3.5–5.2)
PROT SERPL-MCNC: 7.2 G/DL (ref 6–8.5)
PROT UR QL STRIP: ABNORMAL
RBC # BLD AUTO: 4.55 10*6/MM3 (ref 3.77–5.28)
RBC # UR STRIP: ABNORMAL /HPF
REF LAB TEST METHOD: ABNORMAL
RVA RNA STL QL NAA+NON-PROBE: NOT DETECTED
S ENT+BONG DNA STL QL NAA+NON-PROBE: NOT DETECTED
SAPO I+II+IV+V RNA STL QL NAA+NON-PROBE: NOT DETECTED
SHIGELLA SP+EIEC IPAH ST NAA+NON-PROBE: NOT DETECTED
SODIUM SERPL-SCNC: 139 MMOL/L (ref 136–145)
SP GR UR STRIP: >1.03 (ref 1–1.03)
SQUAMOUS #/AREA URNS HPF: ABNORMAL /HPF
UROBILINOGEN UR QL STRIP: ABNORMAL
V CHOL+PARA+VUL DNA STL QL NAA+NON-PROBE: NOT DETECTED
V CHOLERAE DNA STL QL NAA+NON-PROBE: NOT DETECTED
WBC # UR STRIP: ABNORMAL /HPF
WBC NRBC COR # BLD AUTO: 4.36 10*3/MM3 (ref 3.4–10.8)
WHOLE BLOOD HOLD COAG: NORMAL
WHOLE BLOOD HOLD SPECIMEN: NORMAL
Y ENTEROCOL DNA STL QL NAA+NON-PROBE: NOT DETECTED

## 2024-09-15 PROCEDURE — 83690 ASSAY OF LIPASE: CPT

## 2024-09-15 PROCEDURE — 85014 HEMATOCRIT: CPT | Performed by: STUDENT IN AN ORGANIZED HEALTH CARE EDUCATION/TRAINING PROGRAM

## 2024-09-15 PROCEDURE — 87507 IADNA-DNA/RNA PROBE TQ 12-25: CPT | Performed by: STUDENT IN AN ORGANIZED HEALTH CARE EDUCATION/TRAINING PROGRAM

## 2024-09-15 PROCEDURE — 25010000002 ONDANSETRON PER 1 MG: Performed by: STUDENT IN AN ORGANIZED HEALTH CARE EDUCATION/TRAINING PROGRAM

## 2024-09-15 PROCEDURE — 25810000003 LACTATED RINGERS SOLUTION: Performed by: PHYSICIAN ASSISTANT

## 2024-09-15 PROCEDURE — 25510000001 IOPAMIDOL 61 % SOLUTION: Performed by: EMERGENCY MEDICINE

## 2024-09-15 PROCEDURE — 83605 ASSAY OF LACTIC ACID: CPT | Performed by: PHYSICIAN ASSISTANT

## 2024-09-15 PROCEDURE — 96366 THER/PROPH/DIAG IV INF ADDON: CPT

## 2024-09-15 PROCEDURE — 96376 TX/PRO/DX INJ SAME DRUG ADON: CPT

## 2024-09-15 PROCEDURE — G0378 HOSPITAL OBSERVATION PER HR: HCPCS

## 2024-09-15 PROCEDURE — 99285 EMERGENCY DEPT VISIT HI MDM: CPT

## 2024-09-15 PROCEDURE — 99221 1ST HOSP IP/OBS SF/LOW 40: CPT | Performed by: INTERNAL MEDICINE

## 2024-09-15 PROCEDURE — 81001 URINALYSIS AUTO W/SCOPE: CPT | Performed by: EMERGENCY MEDICINE

## 2024-09-15 PROCEDURE — 85025 COMPLETE CBC W/AUTO DIFF WBC: CPT

## 2024-09-15 PROCEDURE — 25010000002 POTASSIUM CHLORIDE 10 MEQ/100ML SOLUTION: Performed by: STUDENT IN AN ORGANIZED HEALTH CARE EDUCATION/TRAINING PROGRAM

## 2024-09-15 PROCEDURE — 96361 HYDRATE IV INFUSION ADD-ON: CPT

## 2024-09-15 PROCEDURE — 80053 COMPREHEN METABOLIC PANEL: CPT

## 2024-09-15 PROCEDURE — 96375 TX/PRO/DX INJ NEW DRUG ADDON: CPT

## 2024-09-15 PROCEDURE — 87493 C DIFF AMPLIFIED PROBE: CPT | Performed by: INTERNAL MEDICINE

## 2024-09-15 PROCEDURE — 96365 THER/PROPH/DIAG IV INF INIT: CPT

## 2024-09-15 PROCEDURE — 25810000003 LACTATED RINGERS PER 1000 ML: Performed by: STUDENT IN AN ORGANIZED HEALTH CARE EDUCATION/TRAINING PROGRAM

## 2024-09-15 PROCEDURE — 84703 CHORIONIC GONADOTROPIN ASSAY: CPT

## 2024-09-15 PROCEDURE — 25010000002 PIPERACILLIN SOD-TAZOBACTAM PER 1 G: Performed by: PHYSICIAN ASSISTANT

## 2024-09-15 PROCEDURE — 85018 HEMOGLOBIN: CPT | Performed by: STUDENT IN AN ORGANIZED HEALTH CARE EDUCATION/TRAINING PROGRAM

## 2024-09-15 PROCEDURE — 74177 CT ABD & PELVIS W/CONTRAST: CPT

## 2024-09-15 PROCEDURE — 25010000002 DROPERIDOL PER 5 MG: Performed by: PHYSICIAN ASSISTANT

## 2024-09-15 PROCEDURE — 83630 LACTOFERRIN FECAL (QUAL): CPT | Performed by: INTERNAL MEDICINE

## 2024-09-15 PROCEDURE — 96367 TX/PROPH/DG ADDL SEQ IV INF: CPT

## 2024-09-15 RX ORDER — BISACODYL 5 MG/1
5 TABLET, DELAYED RELEASE ORAL DAILY PRN
Status: DISCONTINUED | OUTPATIENT
Start: 2024-09-15 | End: 2024-09-16 | Stop reason: HOSPADM

## 2024-09-15 RX ORDER — BISACODYL 10 MG
10 SUPPOSITORY, RECTAL RECTAL DAILY PRN
Status: DISCONTINUED | OUTPATIENT
Start: 2024-09-15 | End: 2024-09-16 | Stop reason: HOSPADM

## 2024-09-15 RX ORDER — IOPAMIDOL 612 MG/ML
100 INJECTION, SOLUTION INTRAVASCULAR
Status: COMPLETED | OUTPATIENT
Start: 2024-09-15 | End: 2024-09-15

## 2024-09-15 RX ORDER — POLYETHYLENE GLYCOL 3350 17 G/17G
17 POWDER, FOR SOLUTION ORAL DAILY PRN
Status: DISCONTINUED | OUTPATIENT
Start: 2024-09-15 | End: 2024-09-16 | Stop reason: HOSPADM

## 2024-09-15 RX ORDER — AMOXICILLIN 250 MG
2 CAPSULE ORAL 2 TIMES DAILY PRN
Status: DISCONTINUED | OUTPATIENT
Start: 2024-09-15 | End: 2024-09-16 | Stop reason: HOSPADM

## 2024-09-15 RX ORDER — DROPERIDOL 2.5 MG/ML
2.5 INJECTION, SOLUTION INTRAMUSCULAR; INTRAVENOUS ONCE
Status: COMPLETED | OUTPATIENT
Start: 2024-09-15 | End: 2024-09-15

## 2024-09-15 RX ORDER — SODIUM CHLORIDE 0.9 % (FLUSH) 0.9 %
10 SYRINGE (ML) INJECTION AS NEEDED
Status: DISCONTINUED | OUTPATIENT
Start: 2024-09-15 | End: 2024-09-16 | Stop reason: HOSPADM

## 2024-09-15 RX ORDER — SODIUM CHLORIDE, SODIUM LACTATE, POTASSIUM CHLORIDE, CALCIUM CHLORIDE 600; 310; 30; 20 MG/100ML; MG/100ML; MG/100ML; MG/100ML
100 INJECTION, SOLUTION INTRAVENOUS CONTINUOUS
Status: DISCONTINUED | OUTPATIENT
Start: 2024-09-15 | End: 2024-09-16 | Stop reason: HOSPADM

## 2024-09-15 RX ORDER — ONDANSETRON 4 MG/1
4 TABLET, ORALLY DISINTEGRATING ORAL EVERY 8 HOURS PRN
Qty: 20 TABLET | Refills: 0 | Status: SHIPPED | OUTPATIENT
Start: 2024-09-15 | End: 2024-09-16

## 2024-09-15 RX ORDER — ONDANSETRON 2 MG/ML
4 INJECTION INTRAMUSCULAR; INTRAVENOUS EVERY 6 HOURS PRN
Status: DISCONTINUED | OUTPATIENT
Start: 2024-09-15 | End: 2024-09-16 | Stop reason: HOSPADM

## 2024-09-15 RX ORDER — POTASSIUM CHLORIDE 1.5 G/1.58G
40 POWDER, FOR SOLUTION ORAL EVERY 4 HOURS
Status: DISPENSED | OUTPATIENT
Start: 2024-09-15 | End: 2024-09-15

## 2024-09-15 RX ORDER — ONDANSETRON 4 MG/1
4 TABLET, ORALLY DISINTEGRATING ORAL EVERY 6 HOURS PRN
Status: DISCONTINUED | OUTPATIENT
Start: 2024-09-15 | End: 2024-09-16 | Stop reason: HOSPADM

## 2024-09-15 RX ORDER — ACETAMINOPHEN 160 MG/5ML
650 SOLUTION ORAL EVERY 6 HOURS PRN
Status: DISCONTINUED | OUTPATIENT
Start: 2024-09-15 | End: 2024-09-16 | Stop reason: HOSPADM

## 2024-09-15 RX ORDER — POTASSIUM CHLORIDE 7.45 MG/ML
10 INJECTION INTRAVENOUS
Status: COMPLETED | OUTPATIENT
Start: 2024-09-15 | End: 2024-09-15

## 2024-09-15 RX ORDER — CARBAMAZEPINE 200 MG/1
200 TABLET ORAL 3 TIMES DAILY
Status: DISCONTINUED | OUTPATIENT
Start: 2024-09-15 | End: 2024-09-15

## 2024-09-15 RX ORDER — CARBAMAZEPINE 100 MG/1
200 TABLET, CHEWABLE ORAL EVERY 8 HOURS SCHEDULED
Status: DISCONTINUED | OUTPATIENT
Start: 2024-09-15 | End: 2024-09-16 | Stop reason: HOSPADM

## 2024-09-15 RX ADMIN — POTASSIUM CHLORIDE 40 MEQ: 1.5 POWDER, FOR SOLUTION ORAL at 11:27

## 2024-09-15 RX ADMIN — CARBAMAZEPINE 200 MG: 200 TABLET ORAL at 11:28

## 2024-09-15 RX ADMIN — ONDANSETRON 4 MG: 2 INJECTION INTRAMUSCULAR; INTRAVENOUS at 14:41

## 2024-09-15 RX ADMIN — DROPERIDOL 2.5 MG: 2.5 INJECTION, SOLUTION INTRAMUSCULAR; INTRAVENOUS at 04:18

## 2024-09-15 RX ADMIN — POTASSIUM CHLORIDE 10 MEQ: 7.46 INJECTION, SOLUTION INTRAVENOUS at 15:28

## 2024-09-15 RX ADMIN — CARBAMAZEPINE 200 MG: 100 TABLET, CHEWABLE ORAL at 21:28

## 2024-09-15 RX ADMIN — POTASSIUM CHLORIDE 10 MEQ: 7.46 INJECTION, SOLUTION INTRAVENOUS at 17:58

## 2024-09-15 RX ADMIN — ONDANSETRON 4 MG: 2 INJECTION INTRAMUSCULAR; INTRAVENOUS at 21:28

## 2024-09-15 RX ADMIN — ACETAMINOPHEN 650 MG: 650 LIQUID ORAL at 21:28

## 2024-09-15 RX ADMIN — POTASSIUM CHLORIDE 10 MEQ: 7.46 INJECTION, SOLUTION INTRAVENOUS at 19:47

## 2024-09-15 RX ADMIN — SODIUM CHLORIDE, POTASSIUM CHLORIDE, SODIUM LACTATE AND CALCIUM CHLORIDE 100 ML/HR: 600; 310; 30; 20 INJECTION, SOLUTION INTRAVENOUS at 09:36

## 2024-09-15 RX ADMIN — SODIUM CHLORIDE, POTASSIUM CHLORIDE, SODIUM LACTATE AND CALCIUM CHLORIDE 1000 ML: 600; 310; 30; 20 INJECTION, SOLUTION INTRAVENOUS at 04:16

## 2024-09-15 RX ADMIN — PIPERACILLIN AND TAZOBACTAM 3.38 G: 3; .375 INJECTION, POWDER, LYOPHILIZED, FOR SOLUTION INTRAVENOUS at 07:13

## 2024-09-15 RX ADMIN — IOPAMIDOL 85 ML: 612 INJECTION, SOLUTION INTRAVENOUS at 04:57

## 2024-09-15 RX ADMIN — LANSOPRAZOLE 30 MG: 15 TABLET, ORALLY DISINTEGRATING ORAL at 11:22

## 2024-09-15 RX ADMIN — POTASSIUM CHLORIDE 10 MEQ: 7.46 INJECTION, SOLUTION INTRAVENOUS at 16:54

## 2024-09-16 VITALS
OXYGEN SATURATION: 98 % | HEART RATE: 96 BPM | HEIGHT: 60 IN | DIASTOLIC BLOOD PRESSURE: 84 MMHG | RESPIRATION RATE: 16 BRPM | BODY MASS INDEX: 33.93 KG/M2 | TEMPERATURE: 96.6 F | SYSTOLIC BLOOD PRESSURE: 130 MMHG | WEIGHT: 172.84 LBS

## 2024-09-16 PROBLEM — R11.2 INTRACTABLE NAUSEA AND VOMITING: Status: RESOLVED | Noted: 2024-09-15 | Resolved: 2024-09-16

## 2024-09-16 PROBLEM — A08.11 ACUTE GASTROENTEROPATHY DUE TO NOROVIRUS: Status: ACTIVE | Noted: 2024-09-15

## 2024-09-16 LAB
ANION GAP SERPL CALCULATED.3IONS-SCNC: 7.2 MMOL/L (ref 5–15)
BUN SERPL-MCNC: 6 MG/DL (ref 6–20)
BUN/CREAT SERPL: 11.5 (ref 7–25)
CALCIUM SPEC-SCNC: 8 MG/DL (ref 8.6–10.5)
CHLORIDE SERPL-SCNC: 108 MMOL/L (ref 98–107)
CO2 SERPL-SCNC: 22.8 MMOL/L (ref 22–29)
CREAT SERPL-MCNC: 0.52 MG/DL (ref 0.57–1)
DEPRECATED RDW RBC AUTO: 42.9 FL (ref 37–54)
EGFRCR SERPLBLD CKD-EPI 2021: 118.4 ML/MIN/1.73
ERYTHROCYTE [DISTWIDTH] IN BLOOD BY AUTOMATED COUNT: 13.9 % (ref 12.3–15.4)
GLUCOSE SERPL-MCNC: 94 MG/DL (ref 65–99)
HCT VFR BLD AUTO: 32.9 % (ref 34–46.6)
HGB BLD-MCNC: 11 G/DL (ref 12–15.9)
MCH RBC QN AUTO: 28.1 PG (ref 26.6–33)
MCHC RBC AUTO-ENTMCNC: 33.4 G/DL (ref 31.5–35.7)
MCV RBC AUTO: 84.1 FL (ref 79–97)
PLATELET # BLD AUTO: 227 10*3/MM3 (ref 140–450)
PMV BLD AUTO: 9.9 FL (ref 6–12)
POTASSIUM SERPL-SCNC: 3.5 MMOL/L (ref 3.5–5.2)
RBC # BLD AUTO: 3.91 10*6/MM3 (ref 3.77–5.28)
SODIUM SERPL-SCNC: 138 MMOL/L (ref 136–145)
WBC NRBC COR # BLD AUTO: 3.6 10*3/MM3 (ref 3.4–10.8)

## 2024-09-16 PROCEDURE — 96376 TX/PRO/DX INJ SAME DRUG ADON: CPT

## 2024-09-16 PROCEDURE — 85027 COMPLETE CBC AUTOMATED: CPT | Performed by: STUDENT IN AN ORGANIZED HEALTH CARE EDUCATION/TRAINING PROGRAM

## 2024-09-16 PROCEDURE — G0378 HOSPITAL OBSERVATION PER HR: HCPCS

## 2024-09-16 PROCEDURE — 25810000003 LACTATED RINGERS PER 1000 ML: Performed by: STUDENT IN AN ORGANIZED HEALTH CARE EDUCATION/TRAINING PROGRAM

## 2024-09-16 PROCEDURE — 36415 COLL VENOUS BLD VENIPUNCTURE: CPT | Performed by: STUDENT IN AN ORGANIZED HEALTH CARE EDUCATION/TRAINING PROGRAM

## 2024-09-16 PROCEDURE — 80048 BASIC METABOLIC PNL TOTAL CA: CPT | Performed by: STUDENT IN AN ORGANIZED HEALTH CARE EDUCATION/TRAINING PROGRAM

## 2024-09-16 PROCEDURE — 25010000002 ONDANSETRON PER 1 MG: Performed by: STUDENT IN AN ORGANIZED HEALTH CARE EDUCATION/TRAINING PROGRAM

## 2024-09-16 RX ORDER — ONDANSETRON 4 MG/1
4 TABLET, ORALLY DISINTEGRATING ORAL EVERY 8 HOURS PRN
Qty: 20 TABLET | Refills: 0 | Status: SHIPPED | OUTPATIENT
Start: 2024-09-16

## 2024-09-16 RX ADMIN — SODIUM CHLORIDE, POTASSIUM CHLORIDE, SODIUM LACTATE AND CALCIUM CHLORIDE 100 ML/HR: 600; 310; 30; 20 INJECTION, SOLUTION INTRAVENOUS at 02:56

## 2024-09-16 RX ADMIN — ONDANSETRON 4 MG: 2 INJECTION INTRAMUSCULAR; INTRAVENOUS at 10:04

## 2024-09-16 RX ADMIN — CARBAMAZEPINE 200 MG: 100 TABLET, CHEWABLE ORAL at 06:46

## 2024-09-16 RX ADMIN — LANSOPRAZOLE 30 MG: 15 TABLET, ORALLY DISINTEGRATING ORAL at 06:46

## 2024-10-25 ENCOUNTER — OFFICE VISIT (OUTPATIENT)
Dept: FAMILY MEDICINE CLINIC | Facility: CLINIC | Age: 44
End: 2024-10-25
Payer: MEDICARE

## 2024-10-25 VITALS
BODY MASS INDEX: 33.77 KG/M2 | DIASTOLIC BLOOD PRESSURE: 88 MMHG | OXYGEN SATURATION: 98 % | SYSTOLIC BLOOD PRESSURE: 138 MMHG | WEIGHT: 172 LBS | HEART RATE: 97 BPM | HEIGHT: 60 IN | TEMPERATURE: 97.4 F

## 2024-10-25 DIAGNOSIS — Z01.20 DENTAL EXAMINATION: Primary | ICD-10-CM

## 2024-10-25 DIAGNOSIS — G80.8 OTHER CEREBRAL PALSY: ICD-10-CM

## 2024-10-25 DIAGNOSIS — Z23 ENCOUNTER FOR IMMUNIZATION: ICD-10-CM

## 2024-10-25 PROBLEM — A08.11 ACUTE GASTROENTEROPATHY DUE TO NOROVIRUS: Status: RESOLVED | Noted: 2024-09-15 | Resolved: 2024-10-25

## 2024-10-25 PROCEDURE — 90480 ADMN SARSCOV2 VAC 1/ONLY CMP: CPT | Performed by: STUDENT IN AN ORGANIZED HEALTH CARE EDUCATION/TRAINING PROGRAM

## 2024-10-25 PROCEDURE — 91320 SARSCV2 VAC 30MCG TRS-SUC IM: CPT | Performed by: STUDENT IN AN ORGANIZED HEALTH CARE EDUCATION/TRAINING PROGRAM

## 2024-10-25 PROCEDURE — 99213 OFFICE O/P EST LOW 20 MIN: CPT | Performed by: STUDENT IN AN ORGANIZED HEALTH CARE EDUCATION/TRAINING PROGRAM

## 2024-10-25 RX ORDER — CARBAMAZEPINE 100 MG/1
100 TABLET, CHEWABLE ORAL
COMMUNITY
Start: 2024-01-22 | End: 2025-01-16

## 2024-10-25 NOTE — PROGRESS NOTES
"Chief Complaint  Establish Care    Subjective        Roya Willis presents to [facility]  History of Present Illness    Tamir is here with Roya, works as her caregiver.  Cassidy is here with Roya as well, her mother.     Has been healthy recently. No new concerns.  Patient is nonverbal. Hx is provided by mother.   Had a gynecology exam did not do a pap smear or anything. Would have to go under anesthesia for this exam. Unable to do mammogram either unless sedated. Has to be sedated for dental exams.   Does not seem to have gained weight but prior when she documented 150 mom was guessing on weight. Mom prepares her meals. She doesn't like fruit or vegetables much.       Objective   Vital Signs:  /88 (BP Location: Left arm, Patient Position: Sitting, Cuff Size: Adult)   Pulse 97   Temp 97.4 °F (36.3 °C)   Ht 152.4 cm (60\")   Wt 78 kg (172 lb)   SpO2 98%   BMI 33.59 kg/m²   Estimated body mass index is 33.59 kg/m² as calculated from the following:    Height as of this encounter: 152.4 cm (60\").    Weight as of this encounter: 78 kg (172 lb).          Physical Exam  Vitals and nursing note reviewed.   Constitutional:       General: She is not in acute distress.     Appearance: Normal appearance. She is obese. She is not ill-appearing or toxic-appearing.   HENT:      Head: Normocephalic and atraumatic.      Nose: Nose normal.      Mouth/Throat:      Mouth: Mucous membranes are moist.   Eyes:      General: No scleral icterus.     Extraocular Movements: Extraocular movements intact.      Conjunctiva/sclera: Conjunctivae normal.   Cardiovascular:      Rate and Rhythm: Normal rate and regular rhythm.      Heart sounds: Normal heart sounds. No murmur heard.     No friction rub. No gallop.   Pulmonary:      Effort: Pulmonary effort is normal. No respiratory distress.      Breath sounds: Normal breath sounds. No stridor. No wheezing, rhonchi or rales.   Chest:      Chest " wall: No tenderness.   Musculoskeletal:         General: No swelling, tenderness or deformity. Normal range of motion.      Cervical back: Normal range of motion and neck supple.      Comments: In wheelchair, has flexion contractures BLE   Skin:     General: Skin is warm and dry.      Coloration: Skin is not jaundiced.      Findings: No lesion or rash.   Neurological:      General: No focal deficit present.      Mental Status: She is alert and oriented to person, place, and time. Mental status is at baseline.      Comments: Communicates using limited sign language   Psychiatric:         Mood and Affect: Mood normal.         Behavior: Behavior normal.         Judgment: Judgment normal.      Comments: Calm and cooperative        Result Review :                Assessment and Plan   Diagnoses and all orders for this visit:    1. Dental examination (Primary)  -     Ambulatory Referral to Oral Maxillofacial Surgery    2. Other cerebral palsy  -     Ambulatory Referral to Oral Maxillofacial Surgery    3. Encounter for immunization  -     COVID-19 (Pfizer) 12yrs+ (COMIRNATY)      Here to establish care.  Has history of cerebral palsy.  Overall does well and is healthy.  Mother cares for her as well as has a caregiver.  Has forms with her today that she would like completed for occupational physical therapy.  These were signed.  Needs dental exam but requires general anesthesia.  Patient going under general anesthesia for gynecology exam through [facility].  Referral to oral surgery placed or [facility] as well.  COVID-vaccine ministered today.       Follow Up   Return in about 7 months (around 5/26/2025) for Medicare Wellness.  Patient was given instructions and counseling regarding her condition or for health maintenance advice. Please see specific information pulled into the AVS if appropriate.

## 2024-10-31 ENCOUNTER — PATIENT ROUNDING (BHMG ONLY) (OUTPATIENT)
Dept: FAMILY MEDICINE CLINIC | Facility: CLINIC | Age: 44
End: 2024-10-31
Payer: MEDICARE

## 2024-10-31 NOTE — PROGRESS NOTES
A My-Chart message has been sent to the patient for PATIENT ROUNDING with [facility]

## 2024-12-27 ENCOUNTER — CLINICAL SUPPORT (OUTPATIENT)
Dept: FAMILY MEDICINE CLINIC | Facility: CLINIC | Age: 44
End: 2024-12-27
Payer: MEDICARE

## 2024-12-27 DIAGNOSIS — N94.89 MENSTRUAL SUPPRESSION: ICD-10-CM

## 2024-12-27 DIAGNOSIS — N92.1 BREAKTHROUGH BLEEDING ON DEPO-PROVERA: Primary | ICD-10-CM

## 2024-12-27 PROCEDURE — 96372 THER/PROPH/DIAG INJ SC/IM: CPT | Performed by: STUDENT IN AN ORGANIZED HEALTH CARE EDUCATION/TRAINING PROGRAM

## 2024-12-27 RX ORDER — MEDROXYPROGESTERONE ACETATE 150 MG/ML
150 INJECTION, SUSPENSION INTRAMUSCULAR ONCE
Status: COMPLETED | OUTPATIENT
Start: 2024-12-27 | End: 2024-12-27

## 2024-12-27 RX ADMIN — MEDROXYPROGESTERONE ACETATE 150 MG: 150 INJECTION, SUSPENSION INTRAMUSCULAR at 10:21

## 2025-03-10 ENCOUNTER — CLINICAL SUPPORT (OUTPATIENT)
Dept: FAMILY MEDICINE CLINIC | Facility: CLINIC | Age: 45
End: 2025-03-10
Payer: MEDICARE

## 2025-03-10 DIAGNOSIS — N94.89 MENSTRUAL SUPPRESSION: Primary | ICD-10-CM

## 2025-03-10 PROCEDURE — 96372 THER/PROPH/DIAG INJ SC/IM: CPT

## 2025-03-10 RX ORDER — MEDROXYPROGESTERONE ACETATE 150 MG/ML
150 INJECTION, SUSPENSION INTRAMUSCULAR ONCE
Status: COMPLETED | OUTPATIENT
Start: 2025-03-10 | End: 2025-03-10

## 2025-03-10 RX ADMIN — MEDROXYPROGESTERONE ACETATE 150 MG: 150 INJECTION, SUSPENSION INTRAMUSCULAR at 10:34

## 2025-05-19 ENCOUNTER — TELEPHONE (OUTPATIENT)
Dept: FAMILY MEDICINE CLINIC | Facility: CLINIC | Age: 45
End: 2025-05-19

## 2025-05-19 ENCOUNTER — OFFICE VISIT (OUTPATIENT)
Dept: FAMILY MEDICINE CLINIC | Facility: CLINIC | Age: 45
End: 2025-05-19
Payer: MEDICARE

## 2025-05-19 VITALS
HEIGHT: 60 IN | TEMPERATURE: 98.4 F | BODY MASS INDEX: 34.36 KG/M2 | WEIGHT: 175 LBS | DIASTOLIC BLOOD PRESSURE: 88 MMHG | SYSTOLIC BLOOD PRESSURE: 134 MMHG | HEART RATE: 88 BPM | OXYGEN SATURATION: 99 %

## 2025-05-19 DIAGNOSIS — Z13.220 SCREENING CHOLESTEROL LEVEL: ICD-10-CM

## 2025-05-19 DIAGNOSIS — R06.83 SNORING: ICD-10-CM

## 2025-05-19 DIAGNOSIS — Z00.00 ENCOUNTER FOR SUBSEQUENT ANNUAL WELLNESS VISIT (AWV) IN MEDICARE PATIENT: Primary | ICD-10-CM

## 2025-05-19 DIAGNOSIS — I10 PRIMARY HYPERTENSION: ICD-10-CM

## 2025-05-19 DIAGNOSIS — D64.9 ANEMIA, UNSPECIFIED TYPE: ICD-10-CM

## 2025-05-19 DIAGNOSIS — Z51.81 MEDICATION MONITORING ENCOUNTER: ICD-10-CM

## 2025-05-19 DIAGNOSIS — Z13.1 SCREENING FOR DIABETES MELLITUS (DM): ICD-10-CM

## 2025-05-19 PROCEDURE — 96160 PT-FOCUSED HLTH RISK ASSMT: CPT | Performed by: STUDENT IN AN ORGANIZED HEALTH CARE EDUCATION/TRAINING PROGRAM

## 2025-05-19 PROCEDURE — G0439 PPPS, SUBSEQ VISIT: HCPCS | Performed by: STUDENT IN AN ORGANIZED HEALTH CARE EDUCATION/TRAINING PROGRAM

## 2025-05-19 PROCEDURE — 99213 OFFICE O/P EST LOW 20 MIN: CPT | Performed by: STUDENT IN AN ORGANIZED HEALTH CARE EDUCATION/TRAINING PROGRAM

## 2025-05-19 PROCEDURE — 1126F AMNT PAIN NOTED NONE PRSNT: CPT | Performed by: STUDENT IN AN ORGANIZED HEALTH CARE EDUCATION/TRAINING PROGRAM

## 2025-05-19 PROCEDURE — 1170F FXNL STATUS ASSESSED: CPT | Performed by: STUDENT IN AN ORGANIZED HEALTH CARE EDUCATION/TRAINING PROGRAM

## 2025-05-19 RX ORDER — AZELAIC ACID 0.15 G/G
GEL TOPICAL
COMMUNITY
Start: 2025-05-13

## 2025-05-19 RX ORDER — CARBAMAZEPINE 100 MG/1
TABLET, CHEWABLE ORAL
COMMUNITY
Start: 2025-02-25

## 2025-05-19 NOTE — TELEPHONE ENCOUNTER
Caller: Roya Willis    Relationship to patient: Self      Best call back number: [phone]     Medication PA needed: LISINOPRIL     Reason for call/Prior Auth: PATIENT STATES THAT INSURANCE IS NEEDING PRIOR AUTHORIZATION ON THIS MEDICATION AND WAS TOLD TO CONTACT DR. OLEARY.

## 2025-05-20 ENCOUNTER — PRIOR AUTHORIZATION (OUTPATIENT)
Dept: FAMILY MEDICINE CLINIC | Facility: CLINIC | Age: 45
End: 2025-05-20
Payer: MEDICARE

## 2025-05-20 LAB
ALBUMIN SERPL-MCNC: 3.7 G/DL (ref 3.5–5.2)
ALBUMIN/GLOB SERPL: 1.1 G/DL
ALP SERPL-CCNC: 100 U/L (ref 39–117)
ALT SERPL-CCNC: 9 U/L (ref 1–33)
AST SERPL-CCNC: 16 U/L (ref 1–32)
BASOPHILS # BLD AUTO: 0.02 10*3/MM3 (ref 0–0.2)
BASOPHILS NFR BLD AUTO: 0.5 % (ref 0–1.5)
BILIRUB SERPL-MCNC: 0.3 MG/DL (ref 0–1.2)
BUN SERPL-MCNC: 7 MG/DL (ref 6–20)
BUN/CREAT SERPL: 11.3 (ref 7–25)
CALCIUM SERPL-MCNC: 8.7 MG/DL (ref 8.6–10.5)
CARBAMAZEPINE SERPL-MCNC: 9.2 MCG/ML (ref 4–12)
CHLORIDE SERPL-SCNC: 105 MMOL/L (ref 98–107)
CHOLEST SERPL-MCNC: 223 MG/DL (ref 0–200)
CO2 SERPL-SCNC: 21.8 MMOL/L (ref 22–29)
CREAT SERPL-MCNC: 0.62 MG/DL (ref 0.57–1)
EGFRCR SERPLBLD CKD-EPI 2021: 113.5 ML/MIN/1.73
EOSINOPHIL # BLD AUTO: 0.11 10*3/MM3 (ref 0–0.4)
EOSINOPHIL NFR BLD AUTO: 2.6 % (ref 0.3–6.2)
ERYTHROCYTE [DISTWIDTH] IN BLOOD BY AUTOMATED COUNT: 14 % (ref 12.3–15.4)
GLOBULIN SER CALC-MCNC: 3.5 GM/DL
GLUCOSE SERPL-MCNC: 78 MG/DL (ref 65–99)
HBA1C MFR BLD: 5.4 % (ref 4.8–5.6)
HCT VFR BLD AUTO: 39 % (ref 34–46.6)
HDLC SERPL-MCNC: 69 MG/DL (ref 40–60)
HGB BLD-MCNC: 12.2 G/DL (ref 12–15.9)
IMM GRANULOCYTES # BLD AUTO: 0.01 10*3/MM3 (ref 0–0.05)
IMM GRANULOCYTES NFR BLD AUTO: 0.2 % (ref 0–0.5)
IRON SATN MFR SERPL: 16 % (ref 20–50)
IRON SERPL-MCNC: 66 MCG/DL (ref 37–145)
LDLC SERPL CALC-MCNC: 135 MG/DL (ref 0–100)
LYMPHOCYTES # BLD AUTO: 1.49 10*3/MM3 (ref 0.7–3.1)
LYMPHOCYTES NFR BLD AUTO: 35.7 % (ref 19.6–45.3)
MCH RBC QN AUTO: 26.8 PG (ref 26.6–33)
MCHC RBC AUTO-ENTMCNC: 31.3 G/DL (ref 31.5–35.7)
MCV RBC AUTO: 85.7 FL (ref 79–97)
MONOCYTES # BLD AUTO: 0.46 10*3/MM3 (ref 0.1–0.9)
MONOCYTES NFR BLD AUTO: 11 % (ref 5–12)
NEUTROPHILS # BLD AUTO: 2.08 10*3/MM3 (ref 1.7–7)
NEUTROPHILS NFR BLD AUTO: 50 % (ref 42.7–76)
NRBC BLD AUTO-RTO: 0 /100 WBC (ref 0–0.2)
PLATELET # BLD AUTO: 350 10*3/MM3 (ref 140–450)
POTASSIUM SERPL-SCNC: 4.1 MMOL/L (ref 3.5–5.2)
PROT SERPL-MCNC: 7.2 G/DL (ref 6–8.5)
RBC # BLD AUTO: 4.55 10*6/MM3 (ref 3.77–5.28)
SODIUM SERPL-SCNC: 138 MMOL/L (ref 136–145)
TIBC SERPL-MCNC: 408 MCG/DL
TRIGL SERPL-MCNC: 106 MG/DL (ref 0–150)
UIBC SERPL-MCNC: 342 MCG/DL (ref 112–346)
VLDLC SERPL CALC-MCNC: 19 MG/DL (ref 5–40)
WBC # BLD AUTO: 4.17 10*3/MM3 (ref 3.4–10.8)

## 2025-05-28 ENCOUNTER — CLINICAL SUPPORT (OUTPATIENT)
Dept: FAMILY MEDICINE CLINIC | Facility: CLINIC | Age: 45
End: 2025-05-28
Payer: MEDICARE

## 2025-05-28 DIAGNOSIS — N94.89 MENSTRUAL SUPPRESSION: Primary | ICD-10-CM

## 2025-05-28 DIAGNOSIS — N92.1 BREAKTHROUGH BLEEDING ON DEPO-PROVERA: ICD-10-CM

## 2025-05-28 RX ORDER — MEDROXYPROGESTERONE ACETATE 150 MG/ML
150 INJECTION, SUSPENSION INTRAMUSCULAR ONCE
Status: COMPLETED | OUTPATIENT
Start: 2025-05-28 | End: 2025-05-28

## 2025-05-28 RX ADMIN — MEDROXYPROGESTERONE ACETATE 150 MG: 150 INJECTION, SUSPENSION INTRAMUSCULAR at 09:45

## 2025-06-25 ENCOUNTER — OFFICE VISIT (OUTPATIENT)
Dept: FAMILY MEDICINE CLINIC | Facility: CLINIC | Age: 45
End: 2025-06-25
Payer: MEDICARE

## 2025-06-25 VITALS
BODY MASS INDEX: 34.36 KG/M2 | SYSTOLIC BLOOD PRESSURE: 118 MMHG | DIASTOLIC BLOOD PRESSURE: 82 MMHG | HEART RATE: 91 BPM | OXYGEN SATURATION: 99 % | HEIGHT: 60 IN | WEIGHT: 175 LBS

## 2025-06-25 DIAGNOSIS — G40.909 NONINTRACTABLE EPILEPSY WITHOUT STATUS EPILEPTICUS, UNSPECIFIED EPILEPSY TYPE: ICD-10-CM

## 2025-06-25 DIAGNOSIS — I10 PRIMARY HYPERTENSION: Primary | ICD-10-CM

## 2025-06-25 DIAGNOSIS — G80.8 OTHER CEREBRAL PALSY: ICD-10-CM

## 2025-06-25 DIAGNOSIS — R05.3 CHRONIC COUGH: ICD-10-CM

## 2025-06-25 DIAGNOSIS — E61.1 IRON DEFICIENCY: ICD-10-CM

## 2025-06-25 DIAGNOSIS — K21.9 GASTROESOPHAGEAL REFLUX DISEASE, UNSPECIFIED WHETHER ESOPHAGITIS PRESENT: ICD-10-CM

## 2025-06-25 RX ORDER — PANTOPRAZOLE SODIUM 40 MG/1
40 FOR SUSPENSION ORAL
Qty: 90 EACH | Refills: 1 | Status: SHIPPED | OUTPATIENT
Start: 2025-06-25

## 2025-06-25 NOTE — PROGRESS NOTES
"Chief Complaint  Hypertension    Subjective        Roya Willis presents to [facility]    History of Present Illness  The patient presents for evaluation of blood pressure, iron deficiency, chronic cough, and hiatal hernia.    She has been responding well to the antihypertensive medication initiated during her last visit, with no reported side effects. Her blood pressure has improved from 134/88 to 118/82. She is unable to swallow pills, necessitating the use of a liquid formulation of lisinopril, which was initially costly but has since been covered by insurance. She recently received a refill of this medication.    Despite a slight decrease in iron levels, she has not started any iron supplementation.    She has a persistent cough, which is occasionally managed with expectorants. The cough has not shown any change since the initiation of lisinopril.    She has a known diagnosis of hiatal hernia. The mother administers an acid reflux medication as needed.    The patient's mother reports that she had to reschedule her sleep study appointment 2 or 3 times due to her own health issues. She continues to take Tegretol and has not experienced any new neurological symptoms since the last visit. Her condition remains stable, as it has been for several years.       Objective   Vital Signs:  /82 (BP Location: Left arm, Patient Position: Sitting, Cuff Size: Adult)   Pulse 91   Ht 152.4 cm (60\")   Wt 79.4 kg (175 lb)   SpO2 99%   BMI 34.18 kg/m²   Estimated body mass index is 34.18 kg/m² as calculated from the following:    Height as of this encounter: 152.4 cm (60\").    Weight as of this encounter: 79.4 kg (175 lb).            Physical Exam  Vitals and nursing note reviewed.   Constitutional:       General: She is not in acute distress.     Appearance: Normal appearance. She is not ill-appearing.      Comments: In wheelchair    HENT:      Head: Normocephalic and atraumatic.      " Nose: Nose normal.      Mouth/Throat:      Mouth: Mucous membranes are moist.   Eyes:      Extraocular Movements: Extraocular movements intact.      Conjunctiva/sclera: Conjunctivae normal.   Cardiovascular:      Rate and Rhythm: Normal rate and regular rhythm.      Heart sounds: Normal heart sounds. No murmur heard.     No gallop.   Pulmonary:      Effort: Pulmonary effort is normal. No respiratory distress.      Breath sounds: Normal breath sounds. No stridor. No wheezing, rhonchi or rales.   Chest:      Chest wall: No tenderness.   Skin:     General: Skin is warm and dry.   Neurological:      General: No focal deficit present.      Mental Status: She is alert and oriented to person, place, and time. Mental status is at baseline.   Psychiatric:         Mood and Affect: Mood normal.         Behavior: Behavior normal.      Comments: Nonverbal, questions answered by mother           Physical Exam  Respiratory: Clear to auscultation, no wheezing, rales or rhonchi       Result Review :               Results  Labs   - Cholesterol Test: 05/2025, Bad cholesterol: 135 mg/dL, Total cholesterol: 223 mg/dL   - Iron Saturation Test: 05/2025, Iron saturation: 16%   - Kidney Function Test: 05/2025, Normal   - Complete Blood Count (CBC): 05/2025, Overall blood counts better than 9 months ago from an anemia perspective          Assessment and Plan   Diagnoses and all orders for this visit:    1. Primary hypertension (Primary)  -     Comprehensive metabolic panel  -     Lisinopril 1 MG/ML solution; Take 10 mg by mouth Daily.  Dispense: 900 mL; Refill: 2    2. Other cerebral palsy    3. Nonintractable epilepsy without status epilepticus, unspecified epilepsy type    4. Gastroesophageal reflux disease, unspecified whether esophagitis present  -     pantoprazole (PROTONIX) 40 MG pack packet; Take 1 packet by mouth Every Morning Before Breakfast.  Dispense: 90 each; Refill: 1    5. Iron deficiency    6. Chronic cough        Assessment  & Plan  1. Hypertension.  - Blood pressure has improved to 118/82 from the previous 134/88.  - No side effects reported from lisinopril.  - Kidney function will be monitored with a blood test today due to the initiation of lisinopril.  - Continue current regimen of lisinopril.    2. Iron deficiency.  - Iron saturation was 16 (normal is 20).  - Mild iron deficiency noted, with overall blood counts better than 9 months ago.  - Advised to consider an over-the-counter iron supplement, which can be taken every other day to avoid potential side effects such as constipation.  - Chewable or liquid forms recommended due to difficulty swallowing pills.    3. Chronic cough.  - Cough may be attributed to acid reflux.  - No change in cough with lisinopril.  - Prescription for pantoprazole in powder form, which can be dissolved in water, will be provided.  - Instructed to inform if there are any issues obtaining this medication from the pharmacy.    4. Hiatal hernia.  - Known diagnosis of hiatal hernia.  - Acid reflux medication administered as needed.    Follow-up  - Follow-up appointment scheduled in 3 months.            Follow Up   Return in about 3 months (around 9/25/2025) for Chronic care.  Patient was given instructions and counseling regarding her condition or for health maintenance advice. Please see specific information pulled into the AVS if appropriate.           Patient or patient representative verbalized consent for the use of Ambient Listening during the visit with  Judy Wiley DO for chart documentation. 6/25/2025  12:16 EDT

## 2025-06-26 LAB
ALBUMIN SERPL-MCNC: 3.8 G/DL (ref 3.5–5.2)
ALBUMIN/GLOB SERPL: 1.2 G/DL
ALP SERPL-CCNC: 90 U/L (ref 39–117)
ALT SERPL-CCNC: 13 U/L (ref 1–33)
AST SERPL-CCNC: 17 U/L (ref 1–32)
BILIRUB SERPL-MCNC: 0.2 MG/DL (ref 0–1.2)
BUN SERPL-MCNC: 12 MG/DL (ref 6–20)
BUN/CREAT SERPL: 20.7 (ref 7–25)
CALCIUM SERPL-MCNC: 8.9 MG/DL (ref 8.6–10.5)
CHLORIDE SERPL-SCNC: 104 MMOL/L (ref 98–107)
CO2 SERPL-SCNC: 22.7 MMOL/L (ref 22–29)
CREAT SERPL-MCNC: 0.58 MG/DL (ref 0.57–1)
EGFRCR SERPLBLD CKD-EPI 2021: 115.3 ML/MIN/1.73
GLOBULIN SER CALC-MCNC: 3.3 GM/DL
GLUCOSE SERPL-MCNC: 80 MG/DL (ref 65–99)
POTASSIUM SERPL-SCNC: 4.7 MMOL/L (ref 3.5–5.2)
PROT SERPL-MCNC: 7.1 G/DL (ref 6–8.5)
SODIUM SERPL-SCNC: 138 MMOL/L (ref 136–145)

## 2025-08-14 ENCOUNTER — TELEPHONE (OUTPATIENT)
Dept: FAMILY MEDICINE CLINIC | Facility: CLINIC | Age: 45
End: 2025-08-14
Payer: MEDICARE

## 2025-08-14 DIAGNOSIS — N94.89 MENSTRUAL SUPPRESSION: ICD-10-CM

## 2025-08-14 RX ORDER — MEDROXYPROGESTERONE ACETATE 150 MG/ML
150 INJECTION, SUSPENSION INTRAMUSCULAR
Qty: 1 ML | Refills: 3 | Status: SHIPPED | OUTPATIENT
Start: 2025-08-14

## 2025-08-25 ENCOUNTER — CLINICAL SUPPORT (OUTPATIENT)
Dept: FAMILY MEDICINE CLINIC | Facility: CLINIC | Age: 45
End: 2025-08-25
Payer: MEDICARE

## 2025-08-25 DIAGNOSIS — N94.89 MENSTRUAL SUPPRESSION: Primary | ICD-10-CM

## 2025-08-25 PROCEDURE — 96372 THER/PROPH/DIAG INJ SC/IM: CPT | Performed by: STUDENT IN AN ORGANIZED HEALTH CARE EDUCATION/TRAINING PROGRAM

## 2025-08-25 RX ORDER — MEDROXYPROGESTERONE ACETATE 150 MG/ML
150 INJECTION, SUSPENSION INTRAMUSCULAR ONCE
Status: COMPLETED | OUTPATIENT
Start: 2025-08-25 | End: 2025-08-25

## 2025-08-25 RX ADMIN — MEDROXYPROGESTERONE ACETATE 150 MG: 150 INJECTION, SUSPENSION INTRAMUSCULAR at 16:01

## (undated) DEVICE — CANN O2 ETCO2 FITS ALL CONN CO2 SMPL A/ 7IN DISP LF

## (undated) DEVICE — KT ORCA ORCAPOD DISP STRL

## (undated) DEVICE — SENSR O2 OXIMAX FNGR A/ 18IN NONSTR

## (undated) DEVICE — TUBING, SUCTION, 1/4" X 10', STRAIGHT: Brand: MEDLINE

## (undated) DEVICE — BLCK/BITE BLOX W/DENTL/RIM W/STRAP 54F

## (undated) DEVICE — GOWN,SIRUS,NON REINFRCD,LARGE,SET IN SL: Brand: MEDLINE

## (undated) DEVICE — ADAPT CLN BIOGUARD AIR/H2O DISP

## (undated) DEVICE — FRCP BX RADJAW4 NDL 2.8 240CM LG OG BX40

## (undated) DEVICE — LN SMPL CO2 SHTRM SD STREAM W/M LUER